# Patient Record
Sex: MALE | Race: WHITE | NOT HISPANIC OR LATINO
[De-identification: names, ages, dates, MRNs, and addresses within clinical notes are randomized per-mention and may not be internally consistent; named-entity substitution may affect disease eponyms.]

---

## 2019-03-21 ENCOUNTER — APPOINTMENT (OUTPATIENT)
Dept: DERMATOLOGY | Facility: CLINIC | Age: 59
End: 2019-03-21
Payer: COMMERCIAL

## 2019-03-21 PROCEDURE — 17003 DESTRUCT PREMALG LES 2-14: CPT

## 2019-03-21 PROCEDURE — 99214 OFFICE O/P EST MOD 30 MIN: CPT | Mod: 25

## 2019-03-21 PROCEDURE — 17000 DESTRUCT PREMALG LESION: CPT

## 2020-03-23 ENCOUNTER — APPOINTMENT (OUTPATIENT)
Dept: DERMATOLOGY | Facility: CLINIC | Age: 60
End: 2020-03-23

## 2020-07-07 ENCOUNTER — OUTPATIENT (OUTPATIENT)
Dept: OUTPATIENT SERVICES | Facility: HOSPITAL | Age: 60
LOS: 1 days | End: 2020-07-07

## 2020-07-10 ENCOUNTER — APPOINTMENT (OUTPATIENT)
Dept: DISASTER EMERGENCY | Facility: CLINIC | Age: 60
End: 2020-07-10

## 2020-07-10 DIAGNOSIS — Z01.818 ENCOUNTER FOR OTHER PREPROCEDURAL EXAMINATION: ICD-10-CM

## 2020-07-11 LAB — SARS-COV-2 N GENE NPH QL NAA+PROBE: NOT DETECTED

## 2020-07-13 ENCOUNTER — OUTPATIENT (OUTPATIENT)
Dept: OUTPATIENT SERVICES | Facility: HOSPITAL | Age: 60
LOS: 1 days | End: 2020-07-13

## 2021-05-07 ENCOUNTER — APPOINTMENT (OUTPATIENT)
Dept: SURGICAL ONCOLOGY | Facility: CLINIC | Age: 61
End: 2021-05-07
Payer: COMMERCIAL

## 2021-05-07 VITALS
TEMPERATURE: 97.9 F | SYSTOLIC BLOOD PRESSURE: 146 MMHG | BODY MASS INDEX: 30.11 KG/M2 | HEART RATE: 99 BPM | RESPIRATION RATE: 16 BRPM | HEIGHT: 77 IN | DIASTOLIC BLOOD PRESSURE: 89 MMHG | WEIGHT: 255 LBS | OXYGEN SATURATION: 98 %

## 2021-05-07 PROCEDURE — 99243 OFF/OP CNSLTJ NEW/EST LOW 30: CPT

## 2021-05-07 PROCEDURE — 99072 ADDL SUPL MATRL&STAF TM PHE: CPT

## 2021-05-11 ENCOUNTER — OUTPATIENT (OUTPATIENT)
Dept: OUTPATIENT SERVICES | Facility: HOSPITAL | Age: 61
LOS: 1 days | End: 2021-05-11
Payer: COMMERCIAL

## 2021-05-11 ENCOUNTER — APPOINTMENT (OUTPATIENT)
Dept: CT IMAGING | Facility: CLINIC | Age: 61
End: 2021-05-11
Payer: COMMERCIAL

## 2021-05-11 ENCOUNTER — APPOINTMENT (OUTPATIENT)
Dept: GASTROENTEROLOGY | Facility: CLINIC | Age: 61
End: 2021-05-11

## 2021-05-11 ENCOUNTER — OUTPATIENT (OUTPATIENT)
Dept: OUTPATIENT SERVICES | Facility: HOSPITAL | Age: 61
LOS: 1 days | End: 2021-05-11

## 2021-05-11 DIAGNOSIS — Z00.8 ENCOUNTER FOR OTHER GENERAL EXAMINATION: ICD-10-CM

## 2021-05-11 DIAGNOSIS — R17 UNSPECIFIED JAUNDICE: ICD-10-CM

## 2021-05-11 PROCEDURE — 74160 CT ABDOMEN W/CONTRAST: CPT

## 2021-05-11 PROCEDURE — 74160 CT ABDOMEN W/CONTRAST: CPT | Mod: 26

## 2021-05-17 RX ORDER — APIXABAN 5 MG/1
5 TABLET, FILM COATED ORAL
Qty: 180 | Refills: 1 | Status: ACTIVE | COMMUNITY
Start: 2021-05-10 | End: 1900-01-01

## 2021-05-21 ENCOUNTER — APPOINTMENT (OUTPATIENT)
Dept: GASTROENTEROLOGY | Facility: CLINIC | Age: 61
End: 2021-05-21
Payer: COMMERCIAL

## 2021-05-21 VITALS
RESPIRATION RATE: 16 BRPM | OXYGEN SATURATION: 98 % | SYSTOLIC BLOOD PRESSURE: 148 MMHG | WEIGHT: 256 LBS | HEART RATE: 98 BPM | TEMPERATURE: 98 F | HEIGHT: 77 IN | BODY MASS INDEX: 30.23 KG/M2 | DIASTOLIC BLOOD PRESSURE: 97 MMHG

## 2021-05-21 DIAGNOSIS — R17 UNSPECIFIED JAUNDICE: ICD-10-CM

## 2021-05-21 PROCEDURE — 99203 OFFICE O/P NEW LOW 30 MIN: CPT

## 2021-05-21 PROCEDURE — 99072 ADDL SUPL MATRL&STAF TM PHE: CPT

## 2021-05-21 NOTE — PHYSICAL EXAM
[FreeTextEntry1] : General: No acute distress. Well nourished and well kempt.\par Head: AT/NC\par Eyes: PERRL. EOMI.\par Pulmonary: No respiratory distress. \par ABD: Soft. ND. No rebound, no rigidity. No peritoneal signs. GB palpable on inspiration. cholecystostomy tube in place with minimal drainage, non-bilious.\par Extremities: Warm. No edema. \par Neurological: A&O x 4.\par Psychiatric: Normal affect and mood.\par

## 2021-05-21 NOTE — HISTORY OF PRESENT ILLNESS
[de-identified] : The patient is being consulted for EUS and ERCP.  The patient had symptoms of abdominal discomfort and was evaluated by gastroenterologist in [place].  He underwent EGD and colonoscopy and ultrasound abdominal in the late half of 2020 in beginning of 2021.  He was noted to have reflux and started on PPI therapy.  However his symptoms were worsening.  Eventually he was noted to have jaundice and was admitted in the beginning of April to [facility].  He was noted to have choledocholithiasis and underwent ERCP with stone removal and stent placement.  Eventually he was admitted with nausea and vomiting and was noted to have gastric outlet obstruction.  There was a large abscess noted.  There was a question of perforated gallbladder.  He underwent IR guided drain placement.  His bilirubin has become normal.  CA 19-9 level which was elevated on the index admission has become normal.  There was a question of portal vein thrombosis and on the last imaging recently it is no longer visible.  He is on Eliquis.  He is eating normally.  He is evaluated by Dr. Reg Stovall.  He is referred for EUS ERCP with cholangioscopy.

## 2021-05-21 NOTE — HISTORY OF PRESENT ILLNESS
[de-identified] : Mr. ALEX SAMS is a 61 year old male who presents today for an initial consultation. PMH of GERD, initially presented to [facility] 4/7/21 with abdominal pain and jaundice. Tbili 14.5, Ca19-9 401. Underwent CT 4/7/21 that noted intra and extrahepatic ductal dilation and findings suspicious for L PV thrombus, concerning for hepatobiliary or pancreatic malignancy although no discrete mass identified. Retroperitoneal and portacaval adenopathy suspicious for metastatic disease.  \par \par MRI 4/8/21 did not identify any suspicious hepatobiliary or pancreatic mass. L portal vein thrombus and subsegmental hepatic seg 6 PV thrombus with associated compensatory perfusion changes. Obstructing CBD calculus with proximal dilation of the biliary tract. Patient was started on a heparin gtt for coagulopathy. Underwent an EUS and ERCP 4/9/21 that noted a large 11mm stone impacting the bile duct with proximal biliary dilation.  Immediately distal to the stone was biliary wall thickening and narrowing of unclear etiology. no definitive pancreatic or biliary mass visualized.  Complete stone removal accomplished by biliary sphincterotomy, sphincteroplasty, mechanical lithotripsy and balloon extraction. Brushings and bx performed. 10fr plastic stent placed. bile duct bx showed scant superficial strips of intestinal type epithelium; no HGD identified. Was discharged with outpatient f/u. Bili 5.8 at discharge.\par \par Initially did well after discharge but then developed abd pain and chills as well as n/v and difficulty tolerating PO intake.  Represented to [place] on 4/26/21.  CT consistent with suspected perforated cholecystitis with large fluid collection likely resulting in GOO.  Had NGT placed for decompression. Underwent cholecystostomy placement in IR on 4/28/21 with purulent drainage. Discharged 4/30/21 with 10 day course of Augmentin.  On PO eliquis for PV thrombus. 4/30/21 labs: Ca19-9 is 12. Bili 1.2. \par \par Of note, there was concern for distal cholangio on initial admission given elevated Ca19-9, PVT and distal CBD narrowing. Brushings were negative and Ca19-9 is now normal. \par \par Complains of pain at the insertion site of guillermina tube. minimal drainage. fatigued. no other complaints.

## 2021-05-21 NOTE — ASSESSMENT
[FreeTextEntry1] : Mr. ALEX SAMS is a 61 year old male who presents today for an initial consultation. PMH of GERD, initially presented to [facility] 4/7/21 with abdominal pain and jaundice. Tbili 14.5, Ca19-9 401. I reviewed all of his imaging including CT 4/7/21 and MRI 4/8/21 that noted intra and extrahepatic ductal dilation, L PV thrombus, and concern for hepatobiliary or pancreatic malignancy although I do not see a definitive mass.\par He underwent an EUS and ERCP 4/9/21 that noted a large 11mm stone impacting the bile duct with proximal biliary dilation.  Immediately distal to the stone was biliary wall thickening and narrowing of unclear etiology. no definitive pancreatic or biliary mass visualized and bx negative.  Complete stone removal s/p biliary sphincterotomy, sphincteroplasty, mechanical lithotripsy and balloon extraction. 10fr plastic stent placed. Bili 5.8 at discharge.\par \par He went back into the hospital for perforated cholecystitis with large fluid collection likely resulting in GOO.  s/p cholecystostomy tube placement on 4/28/21 and discharged on Augmentin.  Most recent labs on 4/30/21; [id number]. Bili-1.2. On Eliquis for PV thrombus.\par \par Discussed that there is a concern for malignancy given his age with presentation of jaundice, although his story does not fit the usual presentation of painless jaundice.  The pain he described is more consistent with a benign process. I believe that this large stone may have been the impetus of the initial bout and he may have developed a subsequent episode of acute cholecystitis due to the internal BD stent which may have occluded the cystic duct.  Discussed that after an ERCP and sphincterotomy the biliary system is now colonized with bacteria and if there is a blockage patients can get sick. He now has the guillermina tube in for decompression, which is putting out minimal output.  Discussed that I believe he needs his gallbladder out, but this is not the best time to operate. There is a tremendous amount of inflammation at this time point and we would ideally want this to settle out prior to surgery.  I recommend a new CT to re-assess since his last admission. I also recommend an EUS/ERCP and spyglass in the next couple of weeks with repeat brushings/bx and stent removal. Although the initial bx was negative, we did discuss that there are false negatives and i think repeating a bx during the stent removal is worthwhile.  We will keep the cholecystostomy tube in place for now and once everything settles out we will plan to remove at the time of the cholecystectomy.  The patient wishes to transfer all his care to [facility] and we will get him plugged in with a GI to arrange the EUS/ERCP. We will follow up after the CT.\par \par Today, I personally spent 60 minutes in total time including reviewing imaging and studies, discussing complex treatment regimens, direct face to face time with the patient, patient education and counseling.\par  \par

## 2021-05-21 NOTE — PHYSICAL EXAM
[General Appearance - Alert] : alert [General Appearance - In No Acute Distress] : in no acute distress [Sclera] : the sclera and conjunctiva were normal [PERRL With Normal Accommodation] : pupils were equal in size, round, and reactive to light [Extraocular Movements] : extraocular movements were intact [Outer Ear] : the ears and nose were normal in appearance [Oropharynx] : the oropharynx was normal [Neck Appearance] : the appearance of the neck was normal [Neck Cervical Mass (___cm)] : no neck mass was observed [Jugular Venous Distention Increased] : there was no jugular-venous distention [Thyroid Diffuse Enlargement] : the thyroid was not enlarged [Thyroid Nodule] : there were no palpable thyroid nodules [Auscultation Breath Sounds / Voice Sounds] : lungs were clear to auscultation bilaterally [Heart Rate And Rhythm] : heart rate was normal and rhythm regular [Heart Sounds] : normal S1 and S2 [Heart Sounds Gallop] : no gallops [Murmurs] : no murmurs [Heart Sounds Pericardial Friction Rub] : no pericardial rub [Edema] : there was no peripheral edema [Bowel Sounds] : normal bowel sounds [Abdomen Soft] : soft [Abdomen Tenderness] : non-tender [Abdomen Mass (___ Cm)] : no abdominal mass palpated [FreeTextEntry1] : Right sided drain in place [Cervical Lymph Nodes Enlarged Posterior Bilaterally] : posterior cervical [Cervical Lymph Nodes Enlarged Anterior Bilaterally] : anterior cervical [Supraclavicular Lymph Nodes Enlarged Bilaterally] : supraclavicular [No CVA Tenderness] : no ~M costovertebral angle tenderness [No Spinal Tenderness] : no spinal tenderness [Abnormal Walk] : normal gait [Nail Clubbing] : no clubbing  or cyanosis of the fingernails [Motor Tone] : muscle strength and tone were normal [Musculoskeletal - Swelling] : no joint swelling seen [Skin Color & Pigmentation] : normal skin color and pigmentation [Skin Turgor] : normal skin turgor [] : no rash [No Focal Deficits] : no focal deficits [Oriented To Time, Place, And Person] : oriented to person, place, and time [Impaired Insight] : insight and judgment were intact [Affect] : the affect was normal

## 2021-05-21 NOTE — ASSESSMENT
[FreeTextEntry1] : I had an extensive discussion with the patient regarding the procedures.  We will schedule him for EGD/EUS and ERCP with stent pull and subsequent spyglass cholangioscopy.  Whether we have to replace the bile duct stent we will discuss with Dr. Reg Stovall.  The patient is medically optimized for these procedures.  I will obtain the blood work.\par \par Antony Diez MD\par Gastroenterology \par \par

## 2021-05-23 LAB
ALBUMIN SERPL ELPH-MCNC: 4.7 G/DL
ALP BLD-CCNC: 89 U/L
ALT SERPL-CCNC: 87 U/L
ANION GAP SERPL CALC-SCNC: 14 MMOL/L
AST SERPL-CCNC: 43 U/L
BASOPHILS # BLD AUTO: 0.04 K/UL
BASOPHILS NFR BLD AUTO: 0.6 %
BILIRUB SERPL-MCNC: 1.3 MG/DL
BUN SERPL-MCNC: 14 MG/DL
CALCIUM SERPL-MCNC: 10.3 MG/DL
CHLORIDE SERPL-SCNC: 105 MMOL/L
CO2 SERPL-SCNC: 24 MMOL/L
CREAT SERPL-MCNC: 1.08 MG/DL
EOSINOPHIL # BLD AUTO: 0.23 K/UL
EOSINOPHIL NFR BLD AUTO: 3.6 %
GLUCOSE SERPL-MCNC: 110 MG/DL
HCT VFR BLD CALC: 42.2 %
HGB BLD-MCNC: 13.7 G/DL
IMM GRANULOCYTES NFR BLD AUTO: 0.3 %
INR PPP: 1.3 RATIO
LYMPHOCYTES # BLD AUTO: 1.21 K/UL
LYMPHOCYTES NFR BLD AUTO: 18.7 %
MAN DIFF?: NORMAL
MCHC RBC-ENTMCNC: 29.8 PG
MCHC RBC-ENTMCNC: 32.5 GM/DL
MCV RBC AUTO: 91.7 FL
MONOCYTES # BLD AUTO: 0.56 K/UL
MONOCYTES NFR BLD AUTO: 8.7 %
NEUTROPHILS # BLD AUTO: 4.4 K/UL
NEUTROPHILS NFR BLD AUTO: 68.1 %
PLATELET # BLD AUTO: 288 K/UL
POTASSIUM SERPL-SCNC: 4.1 MMOL/L
PROT SERPL-MCNC: 7.6 G/DL
PT BLD: 15.2 SEC
RBC # BLD: 4.6 M/UL
RBC # FLD: 15 %
SODIUM SERPL-SCNC: 143 MMOL/L
WBC # FLD AUTO: 6.46 K/UL

## 2021-06-07 ENCOUNTER — APPOINTMENT (OUTPATIENT)
Dept: DISASTER EMERGENCY | Facility: CLINIC | Age: 61
End: 2021-06-07

## 2021-06-07 LAB — SARS-COV-2 N GENE NPH QL NAA+PROBE: NOT DETECTED

## 2021-06-09 ENCOUNTER — TRANSCRIPTION ENCOUNTER (OUTPATIENT)
Age: 61
End: 2021-06-09

## 2021-06-10 ENCOUNTER — RESULT REVIEW (OUTPATIENT)
Age: 61
End: 2021-06-10

## 2021-06-10 ENCOUNTER — APPOINTMENT (OUTPATIENT)
Dept: GASTROENTEROLOGY | Facility: HOSPITAL | Age: 61
End: 2021-06-10

## 2021-06-10 ENCOUNTER — OUTPATIENT (OUTPATIENT)
Dept: OUTPATIENT SERVICES | Facility: HOSPITAL | Age: 61
LOS: 1 days | End: 2021-06-10
Payer: COMMERCIAL

## 2021-06-10 DIAGNOSIS — K81.9 CHOLECYSTITIS, UNSPECIFIED: ICD-10-CM

## 2021-06-10 DIAGNOSIS — I81 PORTAL VEIN THROMBOSIS: ICD-10-CM

## 2021-06-10 PROCEDURE — 43264 ERCP REMOVE DUCT CALCULI: CPT

## 2021-06-10 PROCEDURE — 43276 ERCP STENT EXCHANGE W/DILATE: CPT | Mod: 59

## 2021-06-10 PROCEDURE — 74330 X-RAY BILE/PANC ENDOSCOPY: CPT

## 2021-06-10 PROCEDURE — 43239 EGD BIOPSY SINGLE/MULTIPLE: CPT

## 2021-06-10 PROCEDURE — 43259 EGD US EXAM DUODENUM/JEJUNUM: CPT

## 2021-06-10 PROCEDURE — 88342 IMHCHEM/IMCYTCHM 1ST ANTB: CPT

## 2021-06-10 PROCEDURE — C1769: CPT

## 2021-06-10 PROCEDURE — 88342 IMHCHEM/IMCYTCHM 1ST ANTB: CPT | Mod: 26

## 2021-06-10 PROCEDURE — 43273 ENDOSCOPIC PANCREATOSCOPY: CPT | Mod: 59

## 2021-06-10 PROCEDURE — 88300 SURGICAL PATH GROSS: CPT | Mod: 26,59

## 2021-06-10 PROCEDURE — 43274 ERCP DUCT STENT PLACEMENT: CPT

## 2021-06-10 PROCEDURE — 88305 TISSUE EXAM BY PATHOLOGIST: CPT | Mod: 26

## 2021-06-10 PROCEDURE — C1773: CPT

## 2021-06-10 PROCEDURE — 88300 SURGICAL PATH GROSS: CPT

## 2021-06-10 PROCEDURE — 43277 ERCP EA DUCT/AMPULLA DILATE: CPT | Mod: 59

## 2021-06-10 PROCEDURE — C1726: CPT

## 2021-06-10 PROCEDURE — 88305 TISSUE EXAM BY PATHOLOGIST: CPT

## 2021-06-12 PROBLEM — I81 PORTAL VEIN THROMBOSIS: Status: ACTIVE | Noted: 2021-05-10

## 2021-06-12 RX ORDER — AMOXICILLIN AND CLAVULANATE POTASSIUM 875; 125 MG/1; MG/1
875-125 TABLET, COATED ORAL
Qty: 20 | Refills: 0 | Status: ACTIVE | COMMUNITY
Start: 2021-06-12 | End: 1900-01-01

## 2021-06-14 LAB — SURGICAL PATHOLOGY STUDY: SIGNIFICANT CHANGE UP

## 2021-06-15 RX ORDER — AMOXICILLIN AND CLAVULANATE POTASSIUM 875; 125 MG/1; MG/1
875-125 TABLET, COATED ORAL
Qty: 20 | Refills: 0 | Status: ACTIVE | COMMUNITY
Start: 2021-06-12 | End: 1900-01-01

## 2021-06-16 ENCOUNTER — APPOINTMENT (OUTPATIENT)
Dept: CT IMAGING | Facility: CLINIC | Age: 61
End: 2021-06-16
Payer: COMMERCIAL

## 2021-06-16 ENCOUNTER — OUTPATIENT (OUTPATIENT)
Dept: OUTPATIENT SERVICES | Facility: HOSPITAL | Age: 61
LOS: 1 days | End: 2021-06-16
Payer: COMMERCIAL

## 2021-06-16 DIAGNOSIS — K80.51 CALCULUS OF BILE DUCT WITHOUT CHOLANGITIS OR CHOLECYSTITIS WITH OBSTRUCTION: ICD-10-CM

## 2021-06-16 PROCEDURE — 74160 CT ABDOMEN W/CONTRAST: CPT | Mod: 26

## 2021-06-16 PROCEDURE — 74160 CT ABDOMEN W/CONTRAST: CPT

## 2021-06-21 ENCOUNTER — APPOINTMENT (OUTPATIENT)
Dept: SURGICAL ONCOLOGY | Facility: CLINIC | Age: 61
End: 2021-06-21
Payer: COMMERCIAL

## 2021-06-21 ENCOUNTER — OUTPATIENT (OUTPATIENT)
Dept: OUTPATIENT SERVICES | Facility: HOSPITAL | Age: 61
LOS: 1 days | End: 2021-06-21
Payer: COMMERCIAL

## 2021-06-21 VITALS
RESPIRATION RATE: 17 BRPM | HEIGHT: 77 IN | SYSTOLIC BLOOD PRESSURE: 153 MMHG | BODY MASS INDEX: 30.23 KG/M2 | DIASTOLIC BLOOD PRESSURE: 88 MMHG | OXYGEN SATURATION: 98 % | HEART RATE: 66 BPM | WEIGHT: 256 LBS

## 2021-06-21 VITALS
WEIGHT: 166.01 LBS | SYSTOLIC BLOOD PRESSURE: 148 MMHG | HEIGHT: 77 IN | OXYGEN SATURATION: 99 % | HEART RATE: 72 BPM | DIASTOLIC BLOOD PRESSURE: 80 MMHG | RESPIRATION RATE: 16 BRPM | TEMPERATURE: 98 F

## 2021-06-21 DIAGNOSIS — K80.51 CALCULUS OF BILE DUCT WITHOUT CHOLANGITIS OR CHOLECYSTITIS WITH OBSTRUCTION: ICD-10-CM

## 2021-06-21 DIAGNOSIS — K80.50 CALCULUS OF BILE DUCT WITHOUT CHOLANGITIS OR CHOLECYSTITIS WITHOUT OBSTRUCTION: ICD-10-CM

## 2021-06-21 DIAGNOSIS — Z98.890 OTHER SPECIFIED POSTPROCEDURAL STATES: Chronic | ICD-10-CM

## 2021-06-21 DIAGNOSIS — Z43.4 ENCOUNTER FOR ATTENTION TO OTHER ARTIFICIAL OPENINGS OF DIGESTIVE TRACT: Chronic | ICD-10-CM

## 2021-06-21 DIAGNOSIS — R09.89 OTHER SPECIFIED SYMPTOMS AND SIGNS INVOLVING THE CIRCULATORY AND RESPIRATORY SYSTEMS: ICD-10-CM

## 2021-06-21 LAB
ALBUMIN SERPL ELPH-MCNC: 4.5 G/DL — SIGNIFICANT CHANGE UP (ref 3.3–5)
ALP SERPL-CCNC: 78 U/L — SIGNIFICANT CHANGE UP (ref 40–120)
ALT FLD-CCNC: 57 U/L — HIGH (ref 4–41)
ANION GAP SERPL CALC-SCNC: 14 MMOL/L — SIGNIFICANT CHANGE UP (ref 7–14)
AST SERPL-CCNC: 30 U/L — SIGNIFICANT CHANGE UP (ref 4–40)
BILIRUB SERPL-MCNC: 0.5 MG/DL — SIGNIFICANT CHANGE UP (ref 0.2–1.2)
BUN SERPL-MCNC: 13 MG/DL — SIGNIFICANT CHANGE UP (ref 7–23)
CALCIUM SERPL-MCNC: 9.8 MG/DL — SIGNIFICANT CHANGE UP (ref 8.4–10.5)
CHLORIDE SERPL-SCNC: 104 MMOL/L — SIGNIFICANT CHANGE UP (ref 98–107)
CO2 SERPL-SCNC: 22 MMOL/L — SIGNIFICANT CHANGE UP (ref 22–31)
CREAT SERPL-MCNC: 0.86 MG/DL — SIGNIFICANT CHANGE UP (ref 0.5–1.3)
GLUCOSE SERPL-MCNC: 103 MG/DL — HIGH (ref 70–99)
HCT VFR BLD CALC: 43.7 % — SIGNIFICANT CHANGE UP (ref 39–50)
HGB BLD-MCNC: 14.1 G/DL — SIGNIFICANT CHANGE UP (ref 13–17)
MCHC RBC-ENTMCNC: 29.4 PG — SIGNIFICANT CHANGE UP (ref 27–34)
MCHC RBC-ENTMCNC: 32.3 GM/DL — SIGNIFICANT CHANGE UP (ref 32–36)
MCV RBC AUTO: 91.2 FL — SIGNIFICANT CHANGE UP (ref 80–100)
NRBC # BLD: 0 /100 WBCS — SIGNIFICANT CHANGE UP
NRBC # FLD: 0 K/UL — SIGNIFICANT CHANGE UP
PLATELET # BLD AUTO: 339 K/UL — SIGNIFICANT CHANGE UP (ref 150–400)
POTASSIUM SERPL-MCNC: 4 MMOL/L — SIGNIFICANT CHANGE UP (ref 3.5–5.3)
POTASSIUM SERPL-SCNC: 4 MMOL/L — SIGNIFICANT CHANGE UP (ref 3.5–5.3)
PROT SERPL-MCNC: 7.6 G/DL — SIGNIFICANT CHANGE UP (ref 6–8.3)
RBC # BLD: 4.79 M/UL — SIGNIFICANT CHANGE UP (ref 4.2–5.8)
RBC # FLD: 14.2 % — SIGNIFICANT CHANGE UP (ref 10.3–14.5)
SODIUM SERPL-SCNC: 140 MMOL/L — SIGNIFICANT CHANGE UP (ref 135–145)
WBC # BLD: 7.15 K/UL — SIGNIFICANT CHANGE UP (ref 3.8–10.5)
WBC # FLD AUTO: 7.15 K/UL — SIGNIFICANT CHANGE UP (ref 3.8–10.5)

## 2021-06-21 PROCEDURE — 93010 ELECTROCARDIOGRAM REPORT: CPT

## 2021-06-21 PROCEDURE — 99212 OFFICE O/P EST SF 10 MIN: CPT

## 2021-06-21 PROCEDURE — 99072 ADDL SUPL MATRL&STAF TM PHE: CPT

## 2021-06-21 RX ORDER — SODIUM CHLORIDE 9 MG/ML
3 INJECTION INTRAMUSCULAR; INTRAVENOUS; SUBCUTANEOUS EVERY 8 HOURS
Refills: 0 | Status: DISCONTINUED | OUTPATIENT
Start: 2021-07-01 | End: 2021-07-05

## 2021-06-21 NOTE — H&P PST ADULT - NSANTHOSAYNRD_GEN_A_CORE
No. PASHA screening performed.  STOP BANG Legend: 0-2 = LOW Risk; 3-4 = INTERMEDIATE Risk; 5-8 = HIGH Risk

## 2021-06-21 NOTE — H&P PST ADULT - NSICDXPASTMEDICALHX_GEN_ALL_CORE_FT
PAST MEDICAL HISTORY:  Calculus of bile duct     Inguinal hernia     Portal vein thrombosis on Eliquis

## 2021-06-21 NOTE — H&P PST ADULT - GASTROINTESTINAL COMMENTS
biliary drain noted with minimal drainage, suture appears to have come off biliary drain noted with minimal yellow drainage, suture appears to have come off

## 2021-06-21 NOTE — H&P PST ADULT - NSICDXFAMILYHX_GEN_ALL_CORE_FT
FAMILY HISTORY:  Father  Still living? Unknown  FH: lung cancer, Age at diagnosis: Age Unknown    Mother  Still living? Unknown  FH: CHF (congestive heart failure), Age at diagnosis: Age Unknown  FH: heart attack, Age at diagnosis: Age Unknown

## 2021-06-21 NOTE — H&P PST ADULT - HISTORY OF PRESENT ILLNESS
61 year old male admitted to [facility] in 4/2021 for abdominal pain and jaundice, found to have Left portal vein thrombus, and w obstructing stone of CBD, s/p ERCP with biliary sphincterotomy, stone removal and stent placement. Pt treated with anticoagulation for thrombus. Pt  later readmitted for perforated cholecystitis s/p cholecystostomy tube placement and treatment with antibx. Bile duct stent was later rplaced. Pt presents today for presurgical evaluation for ... 61 year old male admitted to [facility] in 4/2021 for abdominal pain and jaundice, found to have Left portal vein thrombus, and with obstructing stone of CBD, s/p ERCP with biliary sphincterotomy, stone removal and stent placement. Pt treated with anticoagulation for thrombus. Pt  later readmitted for perforated cholecystitis s/p cholecystostomy tube placement and treatment with antibx. Bile duct stent was later replaced. Pt presents today for presurgical evaluation for Open Cholecystectomy.

## 2021-06-21 NOTE — H&P PST ADULT - NSICDXPASTSURGICALHX_GEN_ALL_CORE_FT
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

## 2021-06-21 NOTE — H&P PST ADULT - NSICDXPROBLEM_GEN_ALL_CORE_FT
PROBLEM DIAGNOSES  Problem: Calculus of bile duct without cholangitis or cholecystitis  Assessment and Plan: Pt scheduled for surgery on 7/1/2021.  Pre-op instructions provided. Pt verbalized understanding.   Pepcid provided for GI prophylaxis.   Pt given detailed verbal and written instructions on chlorhexidine wash. Pt verbalized understanding with teachback.   Suture on biliary drain appears to have come off - Called Dr. Stovall's office and spoke with NP Beata who states pt can come to their office and she will check the drain.   Spoke to surgical coordinator Michelle who states per surgeon pt should hold Eliquis 48 hours prior to surgery.

## 2021-06-23 NOTE — HISTORY OF PRESENT ILLNESS
[de-identified] : Mr. ALEX SAMS is a 61 year old male who presents today for a follow-up visit.  He presents for assessment of perc. cholecystostomy tube. He went for PSTs today and noted by RN to have possible suture out. \par \par PMH of GERD, initially presented to [facility] 4/7/21 with abdominal pain and jaundice. Tbili 14.5, Ca19-9 401. \par Underwent CT 4/7/21 that noted intra and extrahepatic ductal dilation and findings suspicious for L PV thrombus, concerning for hepatobiliary or pancreatic malignancy although no discrete mass identified. Retroperitoneal and portacaval adenopathy suspicious for metastatic disease.  \par \par MRI 4/8/21 did not identify any suspicious hepatobiliary or pancreatic mass. L portal vein thrombus and subsegmental hepatic seg 6 PV thrombus with associated compensatory perfusion changes. Obstructing CBD calculus with proximal dilation of the biliary tract. Patient was started on a heparin gtt for coagulopathy. Underwent an EUS and ERCP 4/9/21 that noted a large 11mm stone impacting the bile duct with proximal biliary dilation.  Immediately distal to the stone was biliary wall thickening and narrowing of unclear etiology. no definitive pancreatic or biliary mass visualized.  Complete stone removal accomplished by biliary sphincterotomy, sphincteroplasty, mechanical lithotripsy and balloon extraction. Brushings and bx performed. 10fr plastic stent placed. bile duct bx showed scant superficial strips of intestinal type epithelium; no HGD identified. Was discharged with outpatient f/u. Bili 5.8 at discharge.\par \par Initially did well after discharge but then developed abd pain and chills as well as n/v and difficulty tolerating PO intake.  Represented to [place] on 4/26/21.  CT consistent with suspected perforated cholecystitis with large fluid collection likely resulting in GOO.  Had NGT placed for decompression. Underwent cholecystostomy placement in IR on 4/28/21 with purulent drainage. Discharged 4/30/21 with 10 day course of Augmentin.  On PO eliquis for PV thrombus. 4/30/21 labs: Ca19-9 is 12. Bili 1.2. \par \par Of note, there was concern for distal cholangio on initial admission given elevated Ca19-9, PVT and distal CBD narrowing. Brushings were negative and Ca19-9 is now normal. \par \par \par 6/21/21:  Patient reports feeling well.  He continues to have some discomfort local to the perc. guillermina tube site that is not new and not worsening.  Continues to have minimal drainage. Denies any fevers. Tolerating PO.

## 2021-06-23 NOTE — PHYSICAL EXAM
[Normal] : oriented to person, place and time, with appropriate affect [de-identified] : Anicteric  [de-identified] : Supple  [de-identified] : Normal respiratory effort  [de-identified] : Not distended, no acute tenderness, rebound, guarding or rigidity.   Perc guillermina tube with small bilious drainag in bag, intact with sutures on tubing adjacent to skin externally.    [de-identified] : normal appearance

## 2021-06-23 NOTE — ASSESSMENT
[FreeTextEntry1] : 61 year old M with history of perforated cholecystitis with large fluid collection likely resulting in GOO. s/p cholecystostomy tube placement on 4/28/21 for decompression, and was discharged on Augmentin. \par \par Plan:\par Continue cholecystostomy tube which is planned to be removed at the time of the cholecystectomy. He is on schedule for OR 7/1/21.  Patient aware to be mindful of tubing in the interim, which is now taped to abdomen to help prevent dislodgement.

## 2021-06-30 ENCOUNTER — TRANSCRIPTION ENCOUNTER (OUTPATIENT)
Age: 61
End: 2021-06-30

## 2021-06-30 NOTE — ASU PATIENT PROFILE, ADULT - PSH
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

## 2021-07-01 ENCOUNTER — INPATIENT (INPATIENT)
Facility: HOSPITAL | Age: 61
LOS: 3 days | Discharge: HOME | End: 2021-07-05
Attending: SURGERY | Admitting: SURGERY
Payer: COMMERCIAL

## 2021-07-01 ENCOUNTER — APPOINTMENT (OUTPATIENT)
Dept: SURGICAL ONCOLOGY | Facility: HOSPITAL | Age: 61
End: 2021-07-01

## 2021-07-01 VITALS
WEIGHT: 166.01 LBS | OXYGEN SATURATION: 95 % | TEMPERATURE: 98 F | SYSTOLIC BLOOD PRESSURE: 136 MMHG | HEART RATE: 77 BPM | DIASTOLIC BLOOD PRESSURE: 81 MMHG | HEIGHT: 77 IN | RESPIRATION RATE: 16 BRPM

## 2021-07-01 DIAGNOSIS — K80.51 CALCULUS OF BILE DUCT WITHOUT CHOLANGITIS OR CHOLECYSTITIS WITH OBSTRUCTION: ICD-10-CM

## 2021-07-01 DIAGNOSIS — Z98.890 OTHER SPECIFIED POSTPROCEDURAL STATES: Chronic | ICD-10-CM

## 2021-07-01 DIAGNOSIS — Z43.4 ENCOUNTER FOR ATTENTION TO OTHER ARTIFICIAL OPENINGS OF DIGESTIVE TRACT: Chronic | ICD-10-CM

## 2021-07-01 PROCEDURE — 47600 CHOLECYSTECTOMY: CPT

## 2021-07-01 RX ORDER — SODIUM CHLORIDE 9 MG/ML
1000 INJECTION, SOLUTION INTRAVENOUS
Refills: 0 | Status: DISCONTINUED | OUTPATIENT
Start: 2021-07-01 | End: 2021-07-01

## 2021-07-01 RX ORDER — HYDROMORPHONE HYDROCHLORIDE 2 MG/ML
30 INJECTION INTRAMUSCULAR; INTRAVENOUS; SUBCUTANEOUS
Refills: 0 | Status: DISCONTINUED | OUTPATIENT
Start: 2021-07-01 | End: 2021-07-02

## 2021-07-01 RX ORDER — KETOROLAC TROMETHAMINE 30 MG/ML
15 SYRINGE (ML) INJECTION EVERY 8 HOURS
Refills: 0 | Status: DISCONTINUED | OUTPATIENT
Start: 2021-07-01 | End: 2021-07-03

## 2021-07-01 RX ORDER — ENOXAPARIN SODIUM 100 MG/ML
40 INJECTION SUBCUTANEOUS DAILY
Refills: 0 | Status: DISCONTINUED | OUTPATIENT
Start: 2021-07-02 | End: 2021-07-05

## 2021-07-01 RX ORDER — CEFOTETAN DISODIUM 1 G
2 VIAL (EA) INJECTION EVERY 12 HOURS
Refills: 0 | Status: COMPLETED | OUTPATIENT
Start: 2021-07-01 | End: 2021-07-02

## 2021-07-01 RX ORDER — NALOXONE HYDROCHLORIDE 4 MG/.1ML
0.1 SPRAY NASAL
Refills: 0 | Status: DISCONTINUED | OUTPATIENT
Start: 2021-07-01 | End: 2021-07-05

## 2021-07-01 RX ORDER — APIXABAN 2.5 MG/1
1 TABLET, FILM COATED ORAL
Qty: 0 | Refills: 0 | DISCHARGE

## 2021-07-01 RX ORDER — FENTANYL CITRATE 50 UG/ML
25 INJECTION INTRAVENOUS
Refills: 0 | Status: DISCONTINUED | OUTPATIENT
Start: 2021-07-01 | End: 2021-07-01

## 2021-07-01 RX ORDER — ONDANSETRON 8 MG/1
4 TABLET, FILM COATED ORAL ONCE
Refills: 0 | Status: DISCONTINUED | OUTPATIENT
Start: 2021-07-01 | End: 2021-07-01

## 2021-07-01 RX ORDER — HYDROMORPHONE HYDROCHLORIDE 2 MG/ML
0.5 INJECTION INTRAMUSCULAR; INTRAVENOUS; SUBCUTANEOUS
Refills: 0 | Status: DISCONTINUED | OUTPATIENT
Start: 2021-07-01 | End: 2021-07-01

## 2021-07-01 RX ORDER — ACETAMINOPHEN 500 MG
1000 TABLET ORAL EVERY 6 HOURS
Refills: 0 | Status: COMPLETED | OUTPATIENT
Start: 2021-07-01 | End: 2021-07-02

## 2021-07-01 RX ORDER — SODIUM CHLORIDE 9 MG/ML
1000 INJECTION INTRAMUSCULAR; INTRAVENOUS; SUBCUTANEOUS
Refills: 0 | Status: DISCONTINUED | OUTPATIENT
Start: 2021-07-01 | End: 2021-07-02

## 2021-07-01 RX ADMIN — Medication 400 MILLIGRAM(S): at 23:00

## 2021-07-01 RX ADMIN — HYDROMORPHONE HYDROCHLORIDE 0.5 MILLIGRAM(S): 2 INJECTION INTRAMUSCULAR; INTRAVENOUS; SUBCUTANEOUS at 11:17

## 2021-07-01 RX ADMIN — Medication 400 MILLIGRAM(S): at 18:38

## 2021-07-01 RX ADMIN — SODIUM CHLORIDE 20 MILLILITER(S): 9 INJECTION INTRAMUSCULAR; INTRAVENOUS; SUBCUTANEOUS at 21:00

## 2021-07-01 RX ADMIN — HYDROMORPHONE HYDROCHLORIDE 30 MILLILITER(S): 2 INJECTION INTRAMUSCULAR; INTRAVENOUS; SUBCUTANEOUS at 19:27

## 2021-07-01 RX ADMIN — SODIUM CHLORIDE 30 MILLILITER(S): 9 INJECTION, SOLUTION INTRAVENOUS at 06:25

## 2021-07-01 RX ADMIN — Medication 100 GRAM(S): at 21:00

## 2021-07-01 RX ADMIN — HYDROMORPHONE HYDROCHLORIDE 0.5 MILLIGRAM(S): 2 INJECTION INTRAMUSCULAR; INTRAVENOUS; SUBCUTANEOUS at 10:01

## 2021-07-01 RX ADMIN — SODIUM CHLORIDE 3 MILLILITER(S): 9 INJECTION INTRAMUSCULAR; INTRAVENOUS; SUBCUTANEOUS at 22:50

## 2021-07-01 RX ADMIN — HYDROMORPHONE HYDROCHLORIDE 0.5 MILLIGRAM(S): 2 INJECTION INTRAMUSCULAR; INTRAVENOUS; SUBCUTANEOUS at 10:15

## 2021-07-01 RX ADMIN — HYDROMORPHONE HYDROCHLORIDE 30 MILLILITER(S): 2 INJECTION INTRAMUSCULAR; INTRAVENOUS; SUBCUTANEOUS at 13:48

## 2021-07-01 RX ADMIN — HYDROMORPHONE HYDROCHLORIDE 0.5 MILLIGRAM(S): 2 INJECTION INTRAMUSCULAR; INTRAVENOUS; SUBCUTANEOUS at 13:44

## 2021-07-01 RX ADMIN — Medication 1000 MILLIGRAM(S): at 19:42

## 2021-07-01 RX ADMIN — SODIUM CHLORIDE 3 MILLILITER(S): 9 INJECTION INTRAMUSCULAR; INTRAVENOUS; SUBCUTANEOUS at 14:02

## 2021-07-01 NOTE — BRIEF OPERATIVE NOTE - OPERATION/FINDINGS
Right subcostal incision for planned open cholecystectomy. Very densely adherent omentum to liver. Dome of gallbladder identified and opened with return of pus, no bile. Culture sent. Decision made to abort cholecystectomy as further dissection would pose great risk of injury to surrounding structures. Cholecystostomy tube placed in gallbladder. Pre-existing perc guillermina tube removed.

## 2021-07-01 NOTE — PROGRESS NOTE ADULT - ASSESSMENT
Pt is 62 YO man hx of left portal vein thrombosis and stone obstructing CBD s/p ERCP and biliary stent placement, perforated cholecystitis s/p cholecystostomy placement. Pt was scheduled for open cholecystectomy however due to large amounts of adhesion gallbladder was unable to be removed. Drain was placed in gallbladder.     Plan:  - Pain control with Hydromorphone PCA, 25 ug Fentanyl IV push PRN for mod pain, 0.5mg Hydromorphone IV push PRN for severe pain, Acetamenophen 1g IV q6h  - DVT ppx Lovenox subq  - Cefotetan 2mg IV q12H x2 doses for pus found in gallbladder  - Started on Clear liquid diet, advance as tolerated  - OOB and ambulating as tolerated  - F/u AM labs

## 2021-07-01 NOTE — ASU PREOP CHECKLIST - AICD PRESENT
Daily Note     Today's date: 2020  Patient name: Jeremiah Sparrow  : 1975  MRN: [id number]  Referring provider: Almaz Dos Santos MD  Dx:   Encounter Diagnosis     ICD-10-CM    1  Peroneal tendon tear, left, initial encounter S86 312A    2  S/P peroneal tendon repair Z98 890                   Subjective: Pt reports significant increase in LLE pain over past few days that she attributes to walking more and putting more weight onto leg over past week since last session  Scar is TTP and pt said she removed a stitch yesterday from proximal end  Objective: See treatment diary below     Precautions: WBAT in CAM boot, migraines    Manual            PROM L ankle  SH SH          Talar PA GR II  NC            Calcaneal med/lat mobs Gr II  NC                                        Exercise Diary             BAPS- AP, ML L2  20x ea L2  30x ea           Seated HR/TR 20x ea 30x ea           gastroc stretch Strap 30"x3 Strap 30"x3           S/L  Inv/ eversion Seated 30x ea Seated 30x ea           weightshift (biodex if needed) Low box R 10"x10 ea 10"x10 AP & ML                        Toe curls Flex/ext 30x ea Flex/ext 30x ea                                                                                           Modalities            Gameready in elevation perf   15' 15' post, coldest, low                                         Assessment: Tolerated treatment fair  Patient demonstrated fatigue post treatment, exhibited good technique with therapeutic exercises and would benefit from continued PT to address limited mobility and high pain levels  Plan: Continue per plan of care  Progress treatment as tolerated       Tete Brown PTA
no

## 2021-07-01 NOTE — PROGRESS NOTE ADULT - SUBJECTIVE AND OBJECTIVE BOX
Pt was seen postoperatively in the PACU s/p open cholecystostomy tube placement. Pt endorsed some abdominal tenderness but that pain was manageable. Negative flatus. Dressing securely in place. Drain with approximately 50mL serosanguineous discharge. Singh catheter in place with approximately 325mL yellow urine.            Vital Signs Last 24 Hrs  T(C): 36.8 (01 Jul 2021 09:50), Max: 36.8 (01 Jul 2021 09:50)  T(F): 98.2 (01 Jul 2021 09:50), Max: 98.2 (01 Jul 2021 09:50)  HR: 87 (01 Jul 2021 15:00) (74 - 87)  BP: 147/93 (01 Jul 2021 15:00) (136/81 - 180/107)  BP(mean): 104 (01 Jul 2021 15:00) (85 - 121)  RR: 15 (01 Jul 2021 15:00) (9 - 16)  SpO2: 98% (01 Jul 2021 15:00) (94% - 99%)  I&O's Detail    01 Jul 2021 07:01  -  01 Jul 2021 15:52  --------------------------------------------------------  IN:    Lactated Ringers: 375 mL  Total IN: 375 mL    OUT:    Indwelling Catheter - Urethral (mL): 325 mL  Total OUT: 325 mL    Total NET: 50 mL        cefoTEtan  IVPB 2  cefoTEtan  IVPB 2    PAST MEDICAL & SURGICAL HISTORY:  Inguinal hernia    Calculus of bile duct    Portal vein thrombosis  on Eliquis    H/O inguinal hernia repair    H/O nasal septoplasty    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 Drain in place with 50mL serosanguineous discharge  Extremities: WWP

## 2021-07-01 NOTE — PATIENT PROFILE ADULT - NSPROPOAURINARYCATHETER_GEN_A_NUR
Left message for patient at  [phone] (home) to schedule Consult.  Patient to return call to Belen MCCORMICK [phone]   no

## 2021-07-02 LAB
ALBUMIN SERPL ELPH-MCNC: 4.4 G/DL — SIGNIFICANT CHANGE UP (ref 3.3–5)
ALP SERPL-CCNC: 78 U/L — SIGNIFICANT CHANGE UP (ref 40–120)
ALT FLD-CCNC: 65 U/L — HIGH (ref 4–41)
ANION GAP SERPL CALC-SCNC: 16 MMOL/L — HIGH (ref 7–14)
AST SERPL-CCNC: 44 U/L — HIGH (ref 4–40)
BILIRUB SERPL-MCNC: 1.4 MG/DL — HIGH (ref 0.2–1.2)
BUN SERPL-MCNC: 13 MG/DL — SIGNIFICANT CHANGE UP (ref 7–23)
CALCIUM SERPL-MCNC: 9.8 MG/DL — SIGNIFICANT CHANGE UP (ref 8.4–10.5)
CHLORIDE SERPL-SCNC: 100 MMOL/L — SIGNIFICANT CHANGE UP (ref 98–107)
CO2 SERPL-SCNC: 21 MMOL/L — LOW (ref 22–31)
COVID-19 SPIKE DOMAIN AB INTERP: POSITIVE
COVID-19 SPIKE DOMAIN ANTIBODY RESULT: 105 U/ML — HIGH
CREAT SERPL-MCNC: 0.85 MG/DL — SIGNIFICANT CHANGE UP (ref 0.5–1.3)
GLUCOSE SERPL-MCNC: 120 MG/DL — HIGH (ref 70–99)
HCT VFR BLD CALC: 43.5 % — SIGNIFICANT CHANGE UP (ref 39–50)
HGB BLD-MCNC: 14.7 G/DL — SIGNIFICANT CHANGE UP (ref 13–17)
MAGNESIUM SERPL-MCNC: 2.1 MG/DL — SIGNIFICANT CHANGE UP (ref 1.6–2.6)
MCHC RBC-ENTMCNC: 30.5 PG — SIGNIFICANT CHANGE UP (ref 27–34)
MCHC RBC-ENTMCNC: 33.8 GM/DL — SIGNIFICANT CHANGE UP (ref 32–36)
MCV RBC AUTO: 90.2 FL — SIGNIFICANT CHANGE UP (ref 80–100)
NRBC # BLD: 0 /100 WBCS — SIGNIFICANT CHANGE UP
NRBC # FLD: 0 K/UL — SIGNIFICANT CHANGE UP
PHOSPHATE SERPL-MCNC: 3.2 MG/DL — SIGNIFICANT CHANGE UP (ref 2.5–4.5)
PLATELET # BLD AUTO: 215 K/UL — SIGNIFICANT CHANGE UP (ref 150–400)
POTASSIUM SERPL-MCNC: 4.3 MMOL/L — SIGNIFICANT CHANGE UP (ref 3.5–5.3)
POTASSIUM SERPL-SCNC: 4.3 MMOL/L — SIGNIFICANT CHANGE UP (ref 3.5–5.3)
PROT SERPL-MCNC: 7.4 G/DL — SIGNIFICANT CHANGE UP (ref 6–8.3)
RBC # BLD: 4.82 M/UL — SIGNIFICANT CHANGE UP (ref 4.2–5.8)
RBC # FLD: 13.6 % — SIGNIFICANT CHANGE UP (ref 10.3–14.5)
SARS-COV-2 IGG+IGM SERPL QL IA: 105 U/ML — HIGH
SARS-COV-2 IGG+IGM SERPL QL IA: POSITIVE
SODIUM SERPL-SCNC: 137 MMOL/L — SIGNIFICANT CHANGE UP (ref 135–145)
WBC # BLD: 17.69 K/UL — HIGH (ref 3.8–10.5)
WBC # FLD AUTO: 17.69 K/UL — HIGH (ref 3.8–10.5)

## 2021-07-02 RX ORDER — OXYCODONE HYDROCHLORIDE 5 MG/1
10 TABLET ORAL
Refills: 0 | Status: DISCONTINUED | OUTPATIENT
Start: 2021-07-02 | End: 2021-07-05

## 2021-07-02 RX ORDER — OXYCODONE HYDROCHLORIDE 5 MG/1
5 TABLET ORAL
Refills: 0 | Status: DISCONTINUED | OUTPATIENT
Start: 2021-07-02 | End: 2021-07-05

## 2021-07-02 RX ORDER — ACETAMINOPHEN 500 MG
650 TABLET ORAL EVERY 6 HOURS
Refills: 0 | Status: DISCONTINUED | OUTPATIENT
Start: 2021-07-02 | End: 2021-07-03

## 2021-07-02 RX ADMIN — Medication 100 GRAM(S): at 09:03

## 2021-07-02 RX ADMIN — Medication 650 MILLIGRAM(S): at 23:07

## 2021-07-02 RX ADMIN — SODIUM CHLORIDE 3 MILLILITER(S): 9 INJECTION INTRAMUSCULAR; INTRAVENOUS; SUBCUTANEOUS at 13:56

## 2021-07-02 RX ADMIN — Medication 650 MILLIGRAM(S): at 16:49

## 2021-07-02 RX ADMIN — SODIUM CHLORIDE 3 MILLILITER(S): 9 INJECTION INTRAMUSCULAR; INTRAVENOUS; SUBCUTANEOUS at 21:16

## 2021-07-02 RX ADMIN — Medication 400 MILLIGRAM(S): at 11:53

## 2021-07-02 RX ADMIN — HYDROMORPHONE HYDROCHLORIDE 30 MILLILITER(S): 2 INJECTION INTRAMUSCULAR; INTRAVENOUS; SUBCUTANEOUS at 07:16

## 2021-07-02 RX ADMIN — SODIUM CHLORIDE 3 MILLILITER(S): 9 INJECTION INTRAMUSCULAR; INTRAVENOUS; SUBCUTANEOUS at 06:04

## 2021-07-02 RX ADMIN — OXYCODONE HYDROCHLORIDE 10 MILLIGRAM(S): 5 TABLET ORAL at 16:49

## 2021-07-02 RX ADMIN — ENOXAPARIN SODIUM 40 MILLIGRAM(S): 100 INJECTION SUBCUTANEOUS at 12:40

## 2021-07-02 RX ADMIN — OXYCODONE HYDROCHLORIDE 10 MILLIGRAM(S): 5 TABLET ORAL at 13:45

## 2021-07-02 RX ADMIN — Medication 400 MILLIGRAM(S): at 05:26

## 2021-07-02 RX ADMIN — OXYCODONE HYDROCHLORIDE 10 MILLIGRAM(S): 5 TABLET ORAL at 17:45

## 2021-07-02 RX ADMIN — Medication 650 MILLIGRAM(S): at 17:45

## 2021-07-02 RX ADMIN — Medication 15 MILLIGRAM(S): at 10:35

## 2021-07-02 RX ADMIN — OXYCODONE HYDROCHLORIDE 10 MILLIGRAM(S): 5 TABLET ORAL at 13:04

## 2021-07-02 RX ADMIN — Medication 15 MILLIGRAM(S): at 10:50

## 2021-07-02 RX ADMIN — Medication 1000 MILLIGRAM(S): at 11:53

## 2021-07-02 NOTE — PROGRESS NOTE ADULT - ASSESSMENT
Pt is 60 YO man hx of left portal vein thrombosis and stone obstructing CBD s/p ERCP and biliary stent placement, perforated cholecystitis s/p POD1 cholecystostomy placement. Pt was scheduled for open cholecystectomy however due to large amounts of adhesion gallbladder was unable to be removed. Drain was placed in gallbladder, about 25mL serosanguineous discharge.     Plan:  - Pain control with Hydromorphone PCA, 25 ug Fentanyl IV push PRN for mod pain, 0.5mg Hydromorphone IV push PRN for severe pain, Acetamenophen 1g IV q6h. Will wean PCA when ready.   - DVT ppx Lovenox subq  - Cefotetan 2mg IV x1 dose left  - Tolerating regular diet well  - OOB and ambulating as tolerated

## 2021-07-02 NOTE — PROGRESS NOTE ADULT - SUBJECTIVE AND OBJECTIVE BOX
Anesthesia Pain Management Service    SUBJECTIVE: Patient states his pain is managed well with IV PCA.     Pain Scale Score	At rest: ___ 	With Activity: ___ 	[X ] Refer to charted pain scores    THERAPY:    [ ] IV PCA Morphine		[ ] 5 mg/mL	[ ] 1 mg/mL  [X ] IV PCA Hydromorphone	[ ] 5 mg/mL	[X ] 1 mg/mL  [ ] IV PCA Fentanyl		[ ] 50 micrograms/mL    Demand dose __0.2_ lockout __6_ (minutes) Continuous Rate _0__ Total: _3.8__   mg used (in past 24 hrs)      MEDICATIONS  (STANDING):  acetaminophen  IVPB .. 1000 milliGRAM(s) IV Intermittent every 6 hours  enoxaparin Injectable 40 milliGRAM(s) SubCutaneous daily  sodium chloride 0.9% lock flush 3 milliLiter(s) IV Push every 8 hours  sodium chloride 0.9%. 1000 milliLiter(s) (20 mL/Hr) IV Continuous <Continuous>    MEDICATIONS  (PRN):  ketorolac   Injectable 15 milliGRAM(s) IV Push every 8 hours PRN Moderate Pain (4 - 6)  naloxone Injectable 0.1 milliGRAM(s) IV Push every 3 minutes PRN For ANY of the following changes in patient status:  A. RR LESS THAN 10 breaths per minute, B. Oxygen saturation LESS THAN 90%, C. Sedation score of 6  oxyCODONE    IR 5 milliGRAM(s) Oral every 3 hours PRN Moderate Pain (4 - 6)  oxyCODONE    IR 10 milliGRAM(s) Oral every 3 hours PRN Severe Pain (7 - 10)      OBJECTIVE: Patient sitting up in bed. Tolerating regular diet.    Sedation Score:	[ X] Alert	[ ] Drowsy 	[ ] Arousable	[ ] Asleep	[ ] Unresponsive    Side Effects:	[X ] None	[ ] Nausea	[ ] Vomiting	[ ] Pruritus  		[ ] Other:    Vital Signs Last 24 Hrs  T(C): 36.9 (02 Jul 2021 05:30), Max: 36.9 (02 Jul 2021 05:30)  T(F): 98.5 (02 Jul 2021 05:30), Max: 98.5 (02 Jul 2021 05:30)  HR: 80 (02 Jul 2021 05:30) (76 - 99)  BP: 149/77 (02 Jul 2021 05:30) (113/81 - 180/107)  BP(mean): 106 (01 Jul 2021 16:35) (99 - 121)  RR: 17 (02 Jul 2021 05:30) (9 - 17)  SpO2: 98% (02 Jul 2021 05:30) (94% - 99%)    ASSESSMENT/ PLAN    Therapy to  be:	[ ] Continue   [ X] Discontinued   [X ] Change to prn Analgesics    Documentation and Verification of current medications:   [X] Done	[ ] Not done, not elligible    Comments: Discussed patient with primary team. IV PCA discontinued.  PRN Oral/IV opioids and/or Adjuvant non-opioid medication to be ordered at this point.    Progress Note written now but Patient was seen earlier.

## 2021-07-02 NOTE — PROGRESS NOTE ADULT - SUBJECTIVE AND OBJECTIVE BOX
Day __2_ of Anesthesia Pain Management Service    SUBJECTIVE:    Therapy:	  [x ] IV PCA	   [ ] Epidural           [ ] s/p Spinal Opoid              [ ] Postpartum infusion	  [ ] Patient controlled regional anesthesia (PCRA)    [ ] prn Analgesics    OBJECTIVE:   [x ] No new signs     [ ] Other:    Side Effects:  [ x] None			[ ] Other:    Assessment of Catheter Site:		[ ] Intact		[ ] Other:    ASSESSMENT/PLAN  [x ] Continue current therapy    [ ] Therapy changed to:    [ ] IV PCA       [ ] Epidural     [ ] prn Analgesics     Comments:

## 2021-07-02 NOTE — PROGRESS NOTE ADULT - SUBJECTIVE AND OBJECTIVE BOX
S: Pt seen and examined. Denies any new complaints overnight. States pain controlled with PCA pump. Tolerating regular diet well, plan to remove montgomery today.                MEDICATIONS  (STANDING):  acetaminophen  IVPB .. 1000 milliGRAM(s) IV Intermittent every 6 hours  cefoTEtan  IVPB 2 Gram(s) IV Intermittent every 12 hours  enoxaparin Injectable 40 milliGRAM(s) SubCutaneous daily  HYDROmorphone PCA (1 mG/mL) 30 milliLiter(s) PCA Continuous PCA Continuous  sodium chloride 0.9% lock flush 3 milliLiter(s) IV Push every 8 hours  sodium chloride 0.9%. 1000 milliLiter(s) (20 mL/Hr) IV Continuous <Continuous>    MEDICATIONS  (PRN):  ketorolac   Injectable 15 milliGRAM(s) IV Push every 8 hours PRN Moderate Pain (4 - 6)  naloxone Injectable 0.1 milliGRAM(s) IV Push every 3 minutes PRN For ANY of the following changes in patient status:  A. RR LESS THAN 10 breaths per minute, B. Oxygen saturation LESS THAN 90%, C. Sedation score of 6      Vital Signs Last 24 Hrs  T(C): 36.9 (02 Jul 2021 05:30), Max: 36.9 (02 Jul 2021 05:30)  T(F): 98.5 (02 Jul 2021 05:30), Max: 98.5 (02 Jul 2021 05:30)  HR: 80 (02 Jul 2021 05:30) (74 - 99)  BP: 149/77 (02 Jul 2021 05:30) (113/81 - 180/107)  BP(mean): 106 (01 Jul 2021 16:35) (85 - 121)  RR: 17 (02 Jul 2021 05:30) (9 - 17)  SpO2: 98% (02 Jul 2021 05:30) (94% - 99%)      I&O's Summary    01 Jul 2021 07:01  -  02 Jul 2021 07:00  --------------------------------------------------------  IN: 915 mL / OUT: 2665 mL / NET: -1750 mL      I&O's Detail    01 Jul 2021 07:01  -  02 Jul 2021 07:00  --------------------------------------------------------  IN:    IV PiggyBack: 100 mL    Lactated Ringers: 375 mL    Oral Fluid: 240 mL    sodium chloride 0.9%: 200 mL  Total IN: 915 mL    OUT:    Bulb (mL): 40 mL    Indwelling Catheter - Urethral (mL): 2625 mL  Total OUT: 2665 mL    Total NET: -1750 mL          General Appearance: Appears well, NAD  Neck: Supple  Chest: Equal expansion bilaterally, equal breath sounds  CV: Pulse regular presently  Abdomen: Soft, nontense, appropriate incisional tenderness, dressings clean and dry and intact. BC drain 25mL serosanguineous discharge.   Extremities: Grossly symmetric, SCD's in place   .  .  .  .  .

## 2021-07-03 LAB
-  AMIKACIN: SIGNIFICANT CHANGE UP
-  AMOXICILLIN/CLAVULANIC ACID: SIGNIFICANT CHANGE UP
-  AMPICILLIN/SULBACTAM: SIGNIFICANT CHANGE UP
-  AMPICILLIN: SIGNIFICANT CHANGE UP
-  AZTREONAM: SIGNIFICANT CHANGE UP
-  CEFAZOLIN: SIGNIFICANT CHANGE UP
-  CEFEPIME: SIGNIFICANT CHANGE UP
-  CEFOXITIN: SIGNIFICANT CHANGE UP
-  CEFTRIAXONE: SIGNIFICANT CHANGE UP
-  CIPROFLOXACIN: SIGNIFICANT CHANGE UP
-  ERTAPENEM: SIGNIFICANT CHANGE UP
-  GENTAMICIN: SIGNIFICANT CHANGE UP
-  IMIPENEM: SIGNIFICANT CHANGE UP
-  LEVOFLOXACIN: SIGNIFICANT CHANGE UP
-  MEROPENEM: SIGNIFICANT CHANGE UP
-  PIPERACILLIN/TAZOBACTAM: SIGNIFICANT CHANGE UP
-  TOBRAMYCIN: SIGNIFICANT CHANGE UP
-  TRIMETHOPRIM/SULFAMETHOXAZOLE: SIGNIFICANT CHANGE UP
ALBUMIN SERPL ELPH-MCNC: 4.2 G/DL — SIGNIFICANT CHANGE UP (ref 3.3–5)
ALP SERPL-CCNC: 74 U/L — SIGNIFICANT CHANGE UP (ref 40–120)
ALT FLD-CCNC: 57 U/L — HIGH (ref 4–41)
ANION GAP SERPL CALC-SCNC: 13 MMOL/L — SIGNIFICANT CHANGE UP (ref 7–14)
AST SERPL-CCNC: 40 U/L — SIGNIFICANT CHANGE UP (ref 4–40)
BILIRUB SERPL-MCNC: 1 MG/DL — SIGNIFICANT CHANGE UP (ref 0.2–1.2)
BUN SERPL-MCNC: 17 MG/DL — SIGNIFICANT CHANGE UP (ref 7–23)
CALCIUM SERPL-MCNC: 10 MG/DL — SIGNIFICANT CHANGE UP (ref 8.4–10.5)
CHLORIDE SERPL-SCNC: 99 MMOL/L — SIGNIFICANT CHANGE UP (ref 98–107)
CO2 SERPL-SCNC: 25 MMOL/L — SIGNIFICANT CHANGE UP (ref 22–31)
CREAT SERPL-MCNC: 1 MG/DL — SIGNIFICANT CHANGE UP (ref 0.5–1.3)
GLUCOSE SERPL-MCNC: 127 MG/DL — HIGH (ref 70–99)
HCT VFR BLD CALC: 40.4 % — SIGNIFICANT CHANGE UP (ref 39–50)
HGB BLD-MCNC: 13.5 G/DL — SIGNIFICANT CHANGE UP (ref 13–17)
MAGNESIUM SERPL-MCNC: 2.1 MG/DL — SIGNIFICANT CHANGE UP (ref 1.6–2.6)
MCHC RBC-ENTMCNC: 30.3 PG — SIGNIFICANT CHANGE UP (ref 27–34)
MCHC RBC-ENTMCNC: 33.4 GM/DL — SIGNIFICANT CHANGE UP (ref 32–36)
MCV RBC AUTO: 90.8 FL — SIGNIFICANT CHANGE UP (ref 80–100)
METHOD TYPE: SIGNIFICANT CHANGE UP
NRBC # BLD: 0 /100 WBCS — SIGNIFICANT CHANGE UP
NRBC # FLD: 0 K/UL — SIGNIFICANT CHANGE UP
PHOSPHATE SERPL-MCNC: 3.3 MG/DL — SIGNIFICANT CHANGE UP (ref 2.5–4.5)
PLATELET # BLD AUTO: 212 K/UL — SIGNIFICANT CHANGE UP (ref 150–400)
POTASSIUM SERPL-MCNC: 4 MMOL/L — SIGNIFICANT CHANGE UP (ref 3.5–5.3)
POTASSIUM SERPL-SCNC: 4 MMOL/L — SIGNIFICANT CHANGE UP (ref 3.5–5.3)
PROT SERPL-MCNC: 7 G/DL — SIGNIFICANT CHANGE UP (ref 6–8.3)
RBC # BLD: 4.45 M/UL — SIGNIFICANT CHANGE UP (ref 4.2–5.8)
RBC # FLD: 13.7 % — SIGNIFICANT CHANGE UP (ref 10.3–14.5)
SODIUM SERPL-SCNC: 137 MMOL/L — SIGNIFICANT CHANGE UP (ref 135–145)
WBC # BLD: 9.9 K/UL — SIGNIFICANT CHANGE UP (ref 3.8–10.5)
WBC # FLD AUTO: 9.9 K/UL — SIGNIFICANT CHANGE UP (ref 3.8–10.5)

## 2021-07-03 RX ORDER — IBUPROFEN 200 MG
400 TABLET ORAL EVERY 6 HOURS
Refills: 0 | Status: DISCONTINUED | OUTPATIENT
Start: 2021-07-03 | End: 2021-07-05

## 2021-07-03 RX ORDER — ACETAMINOPHEN 500 MG
975 TABLET ORAL EVERY 6 HOURS
Refills: 0 | Status: DISCONTINUED | OUTPATIENT
Start: 2021-07-03 | End: 2021-07-05

## 2021-07-03 RX ADMIN — OXYCODONE HYDROCHLORIDE 10 MILLIGRAM(S): 5 TABLET ORAL at 10:40

## 2021-07-03 RX ADMIN — Medication 650 MILLIGRAM(S): at 13:15

## 2021-07-03 RX ADMIN — Medication 650 MILLIGRAM(S): at 12:49

## 2021-07-03 RX ADMIN — OXYCODONE HYDROCHLORIDE 10 MILLIGRAM(S): 5 TABLET ORAL at 09:51

## 2021-07-03 RX ADMIN — Medication 650 MILLIGRAM(S): at 17:43

## 2021-07-03 RX ADMIN — SODIUM CHLORIDE 3 MILLILITER(S): 9 INJECTION INTRAMUSCULAR; INTRAVENOUS; SUBCUTANEOUS at 14:35

## 2021-07-03 RX ADMIN — Medication 650 MILLIGRAM(S): at 04:31

## 2021-07-03 RX ADMIN — OXYCODONE HYDROCHLORIDE 5 MILLIGRAM(S): 5 TABLET ORAL at 17:44

## 2021-07-03 RX ADMIN — OXYCODONE HYDROCHLORIDE 5 MILLIGRAM(S): 5 TABLET ORAL at 18:30

## 2021-07-03 RX ADMIN — Medication 650 MILLIGRAM(S): at 18:30

## 2021-07-03 RX ADMIN — ENOXAPARIN SODIUM 40 MILLIGRAM(S): 100 INJECTION SUBCUTANEOUS at 12:50

## 2021-07-03 RX ADMIN — SODIUM CHLORIDE 3 MILLILITER(S): 9 INJECTION INTRAMUSCULAR; INTRAVENOUS; SUBCUTANEOUS at 22:09

## 2021-07-03 RX ADMIN — SODIUM CHLORIDE 3 MILLILITER(S): 9 INJECTION INTRAMUSCULAR; INTRAVENOUS; SUBCUTANEOUS at 06:00

## 2021-07-03 NOTE — PROGRESS NOTE ADULT - SUBJECTIVE AND OBJECTIVE BOX
Subjective:   Patient seen at bedside this AM. Reports feeling well, without complaints. Denies chest pain, SOB. Tolerating diet without N/V.     24h Events:   - Overnight, no acute events    Objective:  Vital Signs  T(C): 36.9 (07-03 @ 04:30), Max: 37.1 (07-02 @ 12:04)  HR: 83 (07-03 @ 04:30) (76 - 84)  BP: 140/83 (07-03 @ 04:30) (140/83 - 151/72)  RR: 17 (07-03 @ 04:30) (16 - 18)  SpO2: 97% (07-03 @ 04:30) (95% - 98%)  07-01-21 @ 07:01  -  07-02-21 @ 07:00  --------------------------------------------------------  IN:  Total IN: 0 mL    OUT:    Bulb (mL): 40 mL    Indwelling Catheter - Urethral (mL): 2625 mL  Total OUT: 2665 mL    Total NET: -2665 mL      07-02-21 @ 07:01  -  07-03-21 @ 04:39  --------------------------------------------------------  IN:  Total IN: 0 mL    OUT:    Bulb (mL): 33 mL    Indwelling Catheter - Urethral (mL): 500 mL    Voided (mL): 2625 mL  Total OUT: 3158 mL    Total NET: -3158 mL          Physical Exam:  GEN: resting in bed comfortably in NAD  RESP: no increased WOB  ABD: soft, non-distended, non-tender without rebound tenderness or guarding. Incision sites clean, dry, and intact without erythema or edema.  EXTR: warm, well-perfused, no edema  NEURO: awake, alert    Labs:                        14.7   17.69 )-----------( 215      ( 02 Jul 2021 07:30 )             43.5   07-02    137  |  100  |  13  ----------------------------<  120<H>  4.3   |  21<L>  |  0.85    Ca    9.8      02 Jul 2021 07:30  Phos  3.2     07-02  Mg     2.10     07-02    TPro  7.4  /  Alb  4.4  /  TBili  1.4<H>  /  DBili  x   /  AST  44<H>  /  ALT  65<H>  /  AlkPhos  78  07-02    CAPILLARY BLOOD GLUCOSE          Medications:   MEDICATIONS  (STANDING):  acetaminophen   Tablet .. 650 milliGRAM(s) Oral every 6 hours  enoxaparin Injectable 40 milliGRAM(s) SubCutaneous daily  sodium chloride 0.9% lock flush 3 milliLiter(s) IV Push every 8 hours    MEDICATIONS  (PRN):  ketorolac   Injectable 15 milliGRAM(s) IV Push every 8 hours PRN Moderate Pain (4 - 6)  naloxone Injectable 0.1 milliGRAM(s) IV Push every 3 minutes PRN For ANY of the following changes in patient status:  A. RR LESS THAN 10 breaths per minute, B. Oxygen saturation LESS THAN 90%, C. Sedation score of 6  oxyCODONE    IR 5 milliGRAM(s) Oral every 3 hours PRN Moderate Pain (4 - 6)  oxyCODONE    IR 10 milliGRAM(s) Oral every 3 hours PRN Severe Pain (7 - 10)       Subjective:   Patient seen at bedside this AM. Reports feeling well but a bit "sore," without other complaints. Denies chest pain, SOB. Tolerating regular diet without N/V.     24h Events:   - Overnight, no acute events    Objective:  Vital Signs  T(C): 36.9 (07-03 @ 04:30), Max: 37.1 (07-02 @ 12:04)  HR: 83 (07-03 @ 04:30) (76 - 84)  BP: 140/83 (07-03 @ 04:30) (140/83 - 151/72)  RR: 17 (07-03 @ 04:30) (16 - 18)  SpO2: 97% (07-03 @ 04:30) (95% - 98%)  07-01-21 @ 07:01  -  07-02-21 @ 07:00  --------------------------------------------------------  IN:  Total IN: 0 mL    OUT:    Bulb (mL): 40 mL    Indwelling Catheter - Urethral (mL): 2625 mL  Total OUT: 2665 mL    Total NET: -2665 mL      07-02-21 @ 07:01  -  07-03-21 @ 04:39  --------------------------------------------------------  IN:  Total IN: 0 mL    OUT:    Bulb (mL): 33 mL    Indwelling Catheter - Urethral (mL): 500 mL    Voided (mL): 2625 mL  Total OUT: 3158 mL    Total NET: -3158 mL          Physical Exam:  GEN: resting in bed comfortably in NAD  RESP: no increased WOB  ABD: soft, non-distended, non-tender without rebound tenderness or guarding. Incision sites clean, dry, and intact without erythema or edema.  EXTR: warm, well-perfused, no edema  NEURO: awake, alert    Labs:                        14.7   17.69 )-----------( 215      ( 02 Jul 2021 07:30 )             43.5   07-02    137  |  100  |  13  ----------------------------<  120<H>  4.3   |  21<L>  |  0.85    Ca    9.8      02 Jul 2021 07:30  Phos  3.2     07-02  Mg     2.10     07-02    TPro  7.4  /  Alb  4.4  /  TBili  1.4<H>  /  DBili  x   /  AST  44<H>  /  ALT  65<H>  /  AlkPhos  78  07-02    CAPILLARY BLOOD GLUCOSE          Medications:   MEDICATIONS  (STANDING):  acetaminophen   Tablet .. 650 milliGRAM(s) Oral every 6 hours  enoxaparin Injectable 40 milliGRAM(s) SubCutaneous daily  sodium chloride 0.9% lock flush 3 milliLiter(s) IV Push every 8 hours    MEDICATIONS  (PRN):  ketorolac   Injectable 15 milliGRAM(s) IV Push every 8 hours PRN Moderate Pain (4 - 6)  naloxone Injectable 0.1 milliGRAM(s) IV Push every 3 minutes PRN For ANY of the following changes in patient status:  A. RR LESS THAN 10 breaths per minute, B. Oxygen saturation LESS THAN 90%, C. Sedation score of 6  oxyCODONE    IR 5 milliGRAM(s) Oral every 3 hours PRN Moderate Pain (4 - 6)  oxyCODONE    IR 10 milliGRAM(s) Oral every 3 hours PRN Severe Pain (7 - 10)       Subjective:   Patient seen at bedside this AM. Reports feeling well but a bit "sore," without other complaints. Denies chest pain, SOB. Tolerating regular diet without N/V.     24h Events:   - Overnight, no acute events. WBC 9.90 this AM, down from 17 yesterday AM.    Objective:  Vital Signs  T(C): 36.9 (07-03 @ 04:30), Max: 37.1 (07-02 @ 12:04)  HR: 83 (07-03 @ 04:30) (76 - 84)  BP: 140/83 (07-03 @ 04:30) (140/83 - 151/72)  RR: 17 (07-03 @ 04:30) (16 - 18)  SpO2: 97% (07-03 @ 04:30) (95% - 98%)  07-01-21 @ 07:01  -  07-02-21 @ 07:00  --------------------------------------------------------  IN:  Total IN: 0 mL    OUT:    Bulb (mL): 40 mL    Indwelling Catheter - Urethral (mL): 2625 mL  Total OUT: 2665 mL    Total NET: -2665 mL      07-02-21 @ 07:01  -  07-03-21 @ 04:39  --------------------------------------------------------  IN:  Total IN: 0 mL    OUT:    Bulb (mL): 33 mL    Indwelling Catheter - Urethral (mL): 500 mL    Voided (mL): 2625 mL  Total OUT: 3158 mL    Total NET: -3158 mL          Physical Exam:  GEN: resting in bed comfortably in NAD  RESP: no increased WOB  ABD: soft, non-distended, non-tender without rebound tenderness or guarding. Incision sites clean, dry, and intact without erythema or edema.  EXTR: warm, well-perfused, no edema  NEURO: awake, alert    Labs:                        14.7   17.69 )-----------( 215      ( 02 Jul 2021 07:30 )             43.5   07-02    137  |  100  |  13  ----------------------------<  120<H>  4.3   |  21<L>  |  0.85    Ca    9.8      02 Jul 2021 07:30  Phos  3.2     07-02  Mg     2.10     07-02    TPro  7.4  /  Alb  4.4  /  TBili  1.4<H>  /  DBili  x   /  AST  44<H>  /  ALT  65<H>  /  AlkPhos  78  07-02    CAPILLARY BLOOD GLUCOSE          Medications:   MEDICATIONS  (STANDING):  acetaminophen   Tablet .. 650 milliGRAM(s) Oral every 6 hours  enoxaparin Injectable 40 milliGRAM(s) SubCutaneous daily  sodium chloride 0.9% lock flush 3 milliLiter(s) IV Push every 8 hours    MEDICATIONS  (PRN):  ketorolac   Injectable 15 milliGRAM(s) IV Push every 8 hours PRN Moderate Pain (4 - 6)  naloxone Injectable 0.1 milliGRAM(s) IV Push every 3 minutes PRN For ANY of the following changes in patient status:  A. RR LESS THAN 10 breaths per minute, B. Oxygen saturation LESS THAN 90%, C. Sedation score of 6  oxyCODONE    IR 5 milliGRAM(s) Oral every 3 hours PRN Moderate Pain (4 - 6)  oxyCODONE    IR 10 milliGRAM(s) Oral every 3 hours PRN Severe Pain (7 - 10)       Subjective:   Patient seen at bedside this AM. Reports feeling well but a bit "sore," without other complaints. Denies chest pain, SOB. Tolerating regular diet without N/V. No flatus, no BM.    24h Events:   - Overnight, no acute events. WBC 9.90 this AM, down from 17 yesterday AM.    Objective:  Vital Signs  T(C): 36.9 (07-03 @ 04:30), Max: 37.1 (07-02 @ 12:04)  HR: 83 (07-03 @ 04:30) (76 - 84)  BP: 140/83 (07-03 @ 04:30) (140/83 - 151/72)  RR: 17 (07-03 @ 04:30) (16 - 18)  SpO2: 97% (07-03 @ 04:30) (95% - 98%)  07-01-21 @ 07:01  -  07-02-21 @ 07:00  --------------------------------------------------------  IN:  Total IN: 0 mL    OUT:    Bulb (mL): 40 mL    Indwelling Catheter - Urethral (mL): 2625 mL  Total OUT: 2665 mL    Total NET: -2665 mL      07-02-21 @ 07:01  -  07-03-21 @ 04:39  --------------------------------------------------------  IN:  Total IN: 0 mL    OUT:    Bulb (mL): 33 mL    Indwelling Catheter - Urethral (mL): 500 mL    Voided (mL): 2625 mL  Total OUT: 3158 mL    Total NET: -3158 mL          Physical Exam:  GEN: resting in bed comfortably in NAD  RESP: no increased WOB  ABD: soft, non-distended, non-tender without rebound tenderness or guarding. Incision sites clean, dry, and intact without erythema or edema.  EXTR: warm, well-perfused, no edema  NEURO: awake, alert    Labs:                        14.7   17.69 )-----------( 215      ( 02 Jul 2021 07:30 )             43.5   07-02    137  |  100  |  13  ----------------------------<  120<H>  4.3   |  21<L>  |  0.85    Ca    9.8      02 Jul 2021 07:30  Phos  3.2     07-02  Mg     2.10     07-02    TPro  7.4  /  Alb  4.4  /  TBili  1.4<H>  /  DBili  x   /  AST  44<H>  /  ALT  65<H>  /  AlkPhos  78  07-02    CAPILLARY BLOOD GLUCOSE          Medications:   MEDICATIONS  (STANDING):  acetaminophen   Tablet .. 650 milliGRAM(s) Oral every 6 hours  enoxaparin Injectable 40 milliGRAM(s) SubCutaneous daily  sodium chloride 0.9% lock flush 3 milliLiter(s) IV Push every 8 hours    MEDICATIONS  (PRN):  ketorolac   Injectable 15 milliGRAM(s) IV Push every 8 hours PRN Moderate Pain (4 - 6)  naloxone Injectable 0.1 milliGRAM(s) IV Push every 3 minutes PRN For ANY of the following changes in patient status:  A. RR LESS THAN 10 breaths per minute, B. Oxygen saturation LESS THAN 90%, C. Sedation score of 6  oxyCODONE    IR 5 milliGRAM(s) Oral every 3 hours PRN Moderate Pain (4 - 6)  oxyCODONE    IR 10 milliGRAM(s) Oral every 3 hours PRN Severe Pain (7 - 10)

## 2021-07-03 NOTE — PROGRESS NOTE ADULT - ASSESSMENT
Pt is 62 YO man hx of left portal vein thrombosis and stone obstructing CBD s/p ERCP and biliary stent placement, perforated cholecystitis s/p cholecystostomy placement POD2.     Plan:  - Pain control with Hydromorphone PCA, 25 ug Fentanyl IV push PRN for mod pain, 0.5mg Hydromorphone IV push PRN for severe pain, Acetamenophen 1g IV q6h. Will wean PCA when ready.   - DVT ppx Lovenox subq  - Cefotetan 2mg IV x1 dose left  - Tolerating regular diet well  - OOB and ambulating as tolerated Pt is 62 YO man hx of left portal vein thrombosis and stone obstructing CBD s/p ERCP and biliary stent placement, perforated cholecystitis s/p cholecystostomy placement POD2.     Plan:  - Pain control with Toradol 15 mg, Tylenol 650 mg, oxycodone  - DVT ppx Lovenox subq  - Tolerating regular diet well  - OOB and ambulating as tolerated    Josue Bernstein MD  PGY-1 Pt is 60 YO man hx of left portal vein thrombosis and stone obstructing CBD s/p ERCP and biliary stent placement, perforated cholecystitis s/p cholecystostomy placement POD2.    Plan:  - Pain control with Toradol 15 mg, Tylenol 650 mg, oxycodone  - DVT ppx Lovenox subq  - Tolerating regular diet well  - OOB and ambulating as tolerated    Josue Bernstein, PGY-1  D Team Surgery  #[id number] Pt is 62 YO man hx of left portal vein thrombosis and stone obstructing CBD s/p ERCP and biliary stent placement, perforated cholecystitis s/p cholecystostomy placement POD2.    Plan:  - Pain control with Toradol 15 mg, Tylenol 650 mg, oxycodone  - DVT ppx Lovenox subq  - Tolerating regular diet well  - OOB and ambulating as tolerated    Seen and examined with Dr. Zavaleta.    Josue Bernstein, PGY-1  D Team Surgery  #[id number] Pt is 62 YO man hx of left portal vein thrombosis and stone obstructing CBD s/p ERCP and biliary stent placement, perforated cholecystitis s/p cholecystostomy placement POD2.    Plan:  - Pain control with Toradol 15 mg, Tylenol 650 mg, oxycodone  - DVT ppx Lovenox subq  - Tolerating regular diet well  - OOB and ambulating as tolerated    Josue Bernstein, PGY-1  D Team Surgery  #[id number]

## 2021-07-04 ENCOUNTER — TRANSCRIPTION ENCOUNTER (OUTPATIENT)
Age: 61
End: 2021-07-04

## 2021-07-04 LAB
ANION GAP SERPL CALC-SCNC: 14 MMOL/L — SIGNIFICANT CHANGE UP (ref 7–14)
BUN SERPL-MCNC: 16 MG/DL — SIGNIFICANT CHANGE UP (ref 7–23)
CALCIUM SERPL-MCNC: 9.8 MG/DL — SIGNIFICANT CHANGE UP (ref 8.4–10.5)
CHLORIDE SERPL-SCNC: 98 MMOL/L — SIGNIFICANT CHANGE UP (ref 98–107)
CO2 SERPL-SCNC: 20 MMOL/L — LOW (ref 22–31)
CREAT SERPL-MCNC: 0.87 MG/DL — SIGNIFICANT CHANGE UP (ref 0.5–1.3)
GLUCOSE SERPL-MCNC: 110 MG/DL — HIGH (ref 70–99)
HCT VFR BLD CALC: 41.5 % — SIGNIFICANT CHANGE UP (ref 39–50)
HGB BLD-MCNC: 14.1 G/DL — SIGNIFICANT CHANGE UP (ref 13–17)
MAGNESIUM SERPL-MCNC: 2.1 MG/DL — SIGNIFICANT CHANGE UP (ref 1.6–2.6)
MCHC RBC-ENTMCNC: 30 PG — SIGNIFICANT CHANGE UP (ref 27–34)
MCHC RBC-ENTMCNC: 34 GM/DL — SIGNIFICANT CHANGE UP (ref 32–36)
MCV RBC AUTO: 88.3 FL — SIGNIFICANT CHANGE UP (ref 80–100)
NRBC # BLD: 0 /100 WBCS — SIGNIFICANT CHANGE UP
NRBC # FLD: 0 K/UL — SIGNIFICANT CHANGE UP
PHOSPHATE SERPL-MCNC: 3.4 MG/DL — SIGNIFICANT CHANGE UP (ref 2.5–4.5)
PLATELET # BLD AUTO: 224 K/UL — SIGNIFICANT CHANGE UP (ref 150–400)
POTASSIUM SERPL-MCNC: 3.5 MMOL/L — SIGNIFICANT CHANGE UP (ref 3.5–5.3)
POTASSIUM SERPL-SCNC: 3.5 MMOL/L — SIGNIFICANT CHANGE UP (ref 3.5–5.3)
RBC # BLD: 4.7 M/UL — SIGNIFICANT CHANGE UP (ref 4.2–5.8)
RBC # FLD: 13.3 % — SIGNIFICANT CHANGE UP (ref 10.3–14.5)
SODIUM SERPL-SCNC: 132 MMOL/L — LOW (ref 135–145)
WBC # BLD: 8.7 K/UL — SIGNIFICANT CHANGE UP (ref 3.8–10.5)
WBC # FLD AUTO: 8.7 K/UL — SIGNIFICANT CHANGE UP (ref 3.8–10.5)

## 2021-07-04 RX ORDER — POTASSIUM CHLORIDE 20 MEQ
20 PACKET (EA) ORAL
Refills: 0 | Status: COMPLETED | OUTPATIENT
Start: 2021-07-04 | End: 2021-07-04

## 2021-07-04 RX ORDER — OXYCODONE HYDROCHLORIDE 5 MG/1
1 TABLET ORAL
Qty: 15 | Refills: 0
Start: 2021-07-04

## 2021-07-04 RX ADMIN — OXYCODONE HYDROCHLORIDE 5 MILLIGRAM(S): 5 TABLET ORAL at 05:16

## 2021-07-04 RX ADMIN — SODIUM CHLORIDE 3 MILLILITER(S): 9 INJECTION INTRAMUSCULAR; INTRAVENOUS; SUBCUTANEOUS at 22:03

## 2021-07-04 RX ADMIN — OXYCODONE HYDROCHLORIDE 5 MILLIGRAM(S): 5 TABLET ORAL at 04:46

## 2021-07-04 RX ADMIN — Medication 20 MILLIEQUIVALENT(S): at 10:39

## 2021-07-04 RX ADMIN — ENOXAPARIN SODIUM 40 MILLIGRAM(S): 100 INJECTION SUBCUTANEOUS at 11:58

## 2021-07-04 RX ADMIN — SODIUM CHLORIDE 3 MILLILITER(S): 9 INJECTION INTRAMUSCULAR; INTRAVENOUS; SUBCUTANEOUS at 04:47

## 2021-07-04 RX ADMIN — Medication 20 MILLIEQUIVALENT(S): at 11:58

## 2021-07-04 RX ADMIN — SODIUM CHLORIDE 3 MILLILITER(S): 9 INJECTION INTRAMUSCULAR; INTRAVENOUS; SUBCUTANEOUS at 13:09

## 2021-07-04 RX ADMIN — OXYCODONE HYDROCHLORIDE 5 MILLIGRAM(S): 5 TABLET ORAL at 13:37

## 2021-07-04 RX ADMIN — OXYCODONE HYDROCHLORIDE 5 MILLIGRAM(S): 5 TABLET ORAL at 14:14

## 2021-07-04 NOTE — DISCHARGE NOTE PROVIDER - NSDCMRMEDTOKEN_GEN_ALL_CORE_FT
Eliquis 5 mg oral tablet: 1 tab(s) orally 2 times a day  oxyCODONE 5 mg oral tablet: 1 tab(s) orally every 6 to 8 hours, As Needed -for severe pain MDD:3 tabs

## 2021-07-04 NOTE — DISCHARGE NOTE PROVIDER - HOSPITAL COURSE
The patient was admitted to undergo an open cholecystectomy on 7/1/21. During the surgery, very dense adhesions of omentum to the liver and gallbladder made it too dangerous to try and remove the entire gallbladder, so a cholecystostomy tube was placed. The patient tolerated the procedure well. Post-operatively, he tolerated a regular diet by POD2. His pain was well-controlled with oral analgesics by POD3. His cholecystostomy tube drained serosanguinous fluid in the first 4 days post-op.

## 2021-07-04 NOTE — PROGRESS NOTE ADULT - ASSESSMENT
Pt is 61M hx of left portal vein thrombosis and stone obstructing CBD s/p ERCP and biliary stent placement, perforated cholecystitis now s/p open cholecystostomy placement on 7/1. Recovering well.    Plan:  - Pain control with oral meds  - DVT ppx Lovenox subq  - Continue regular diet  - OOB and ambulating as tolerated  - IS    Naldo Ward, PGY-5  D Team Surgery [id number]

## 2021-07-04 NOTE — PROGRESS NOTE ADULT - SUBJECTIVE AND OBJECTIVE BOX
Surgery Progress Note    S: Patient seen and examined. No acute events overnight. Patient's pain is relatively well controlled with oral meds. Tolerating regular diet. No flatus or BM yet. Ambulating.    O:  Vital Signs Last 24 Hrs  T(C): 36.7 (04 Jul 2021 08:20), Max: 37.4 (03 Jul 2021 16:00)  T(F): 98.1 (04 Jul 2021 08:20), Max: 99.3 (03 Jul 2021 16:00)  HR: 74 (04 Jul 2021 08:20) (74 - 85)  BP: 152/82 (04 Jul 2021 08:20) (146/78 - 154/84)  BP(mean): --  RR: 16 (04 Jul 2021 08:20) (16 - 18)  SpO2: 96% (04 Jul 2021 08:20) (95% - 99%)    I&O's Detail    03 Jul 2021 07:01  -  04 Jul 2021 07:00  --------------------------------------------------------  IN:    Oral Fluid: 900 mL  Total IN: 900 mL    OUT:    Bulb (mL): 57 mL    Voided (mL): 2525 mL  Total OUT: 2582 mL    Total NET: -1682 mL          MEDICATIONS  (STANDING):  enoxaparin Injectable 40 milliGRAM(s) SubCutaneous daily  sodium chloride 0.9% lock flush 3 milliLiter(s) IV Push every 8 hours    MEDICATIONS  (PRN):  acetaminophen   Tablet .. 975 milliGRAM(s) Oral every 6 hours PRN Mild Pain (1 - 3)  ibuprofen  Tablet. 400 milliGRAM(s) Oral every 6 hours PRN Moderate Pain (4 - 6)  naloxone Injectable 0.1 milliGRAM(s) IV Push every 3 minutes PRN For ANY of the following changes in patient status:  A. RR LESS THAN 10 breaths per minute, B. Oxygen saturation LESS THAN 90%, C. Sedation score of 6  oxyCODONE    IR 5 milliGRAM(s) Oral every 3 hours PRN Moderate Pain (4 - 6)  oxyCODONE    IR 10 milliGRAM(s) Oral every 3 hours PRN Severe Pain (7 - 10)                            14.1   8.70  )-----------( 224      ( 04 Jul 2021 06:23 )             41.5       07-04    132<L>  |  98  |  16  ----------------------------<  110<H>  3.5   |  20<L>  |  0.87    Ca    9.8      04 Jul 2021 06:23  Phos  3.4     07-04  Mg     2.10     07-04    TPro  7.0  /  Alb  4.2  /  TBili  1.0  /  DBili  x   /  AST  40  /  ALT  57<H>  /  AlkPhos  74  07-03      Physical Exam:  Gen: Laying in bed, NAD  Resp: Unlabored breathing  Abd: soft, venkatesh-incisional tenderness, non-distended. Ruddy drain serosanguinous.  Ext: WWP  Skin: No rashes

## 2021-07-04 NOTE — DISCHARGE NOTE PROVIDER - CARE PROVIDER_API CALL
Reg Stovall)  Surgery  [address], [place] [address]  Phone: [phone]  Fax: ()-  Follow Up Time:

## 2021-07-04 NOTE — DISCHARGE NOTE PROVIDER - NSDCCPCAREPLAN_GEN_ALL_CORE_FT
PRINCIPAL DISCHARGE DIAGNOSIS  Diagnosis: Chronic cholecystitis  Assessment and Plan of Treatment:

## 2021-07-04 NOTE — DISCHARGE NOTE PROVIDER - NSDCFUADDINST_GEN_ALL_CORE_FT
WOUND CARE:  Please keep incisions clean and dry. Please do not Scrub or rub incisions. Do not use lotion or powder on incisions.   BATHING: Please do not submerge wound underwater. You may shower and/or sponge bathe.  ACTIVITY: No heavy lifting or straining until your follow up appointment. Otherwise, you may return to your usual level of physical activity. If you are taking narcotic pain medication (such as Percocet) DO NOT drive a car, operate machinery or make important decisions.  DIET: Return to your usual diet.  NOTIFY YOUR SURGEON IF: You have any bleeding that does not stop, any pus draining from your wound(s), any fever (over 100.4 F) or chills, persistent nausea/vomiting, persistent diarrhea, or if your pain is not controlled on your discharge pain medications.  FOLLOW-UP: Please follow-up with your surgeon 2 weeks following discharge- please call to schedule an appointment.   PAIN CONTROL: A prescription for oxycodon has been sent to your pharmacy. You should only take these for severe pain. For mild or moderate pain, you may take 975mg of tylenol every 6 hours. You may also take ibuprofen 400mg every 6 hours. It is most effective to alternate these two medicines with each other, 3 hours apart.

## 2021-07-05 ENCOUNTER — TRANSCRIPTION ENCOUNTER (OUTPATIENT)
Age: 61
End: 2021-07-05

## 2021-07-05 VITALS
RESPIRATION RATE: 17 BRPM | SYSTOLIC BLOOD PRESSURE: 145 MMHG | OXYGEN SATURATION: 97 % | TEMPERATURE: 98 F | HEART RATE: 79 BPM | DIASTOLIC BLOOD PRESSURE: 82 MMHG

## 2021-07-05 LAB
ANION GAP SERPL CALC-SCNC: 13 MMOL/L — SIGNIFICANT CHANGE UP (ref 7–14)
BUN SERPL-MCNC: 16 MG/DL — SIGNIFICANT CHANGE UP (ref 7–23)
CALCIUM SERPL-MCNC: 10.2 MG/DL — SIGNIFICANT CHANGE UP (ref 8.4–10.5)
CHLORIDE SERPL-SCNC: 102 MMOL/L — SIGNIFICANT CHANGE UP (ref 98–107)
CO2 SERPL-SCNC: 23 MMOL/L — SIGNIFICANT CHANGE UP (ref 22–31)
CREAT SERPL-MCNC: 0.81 MG/DL — SIGNIFICANT CHANGE UP (ref 0.5–1.3)
GLUCOSE SERPL-MCNC: 109 MG/DL — HIGH (ref 70–99)
HCT VFR BLD CALC: 43.4 % — SIGNIFICANT CHANGE UP (ref 39–50)
HGB BLD-MCNC: 14.6 G/DL — SIGNIFICANT CHANGE UP (ref 13–17)
MAGNESIUM SERPL-MCNC: 2.2 MG/DL — SIGNIFICANT CHANGE UP (ref 1.6–2.6)
MCHC RBC-ENTMCNC: 29.7 PG — SIGNIFICANT CHANGE UP (ref 27–34)
MCHC RBC-ENTMCNC: 33.6 GM/DL — SIGNIFICANT CHANGE UP (ref 32–36)
MCV RBC AUTO: 88.4 FL — SIGNIFICANT CHANGE UP (ref 80–100)
NRBC # BLD: 0 /100 WBCS — SIGNIFICANT CHANGE UP
NRBC # FLD: 0 K/UL — SIGNIFICANT CHANGE UP
PHOSPHATE SERPL-MCNC: 4.1 MG/DL — SIGNIFICANT CHANGE UP (ref 2.5–4.5)
PLATELET # BLD AUTO: 244 K/UL — SIGNIFICANT CHANGE UP (ref 150–400)
POTASSIUM SERPL-MCNC: 3.8 MMOL/L — SIGNIFICANT CHANGE UP (ref 3.5–5.3)
POTASSIUM SERPL-SCNC: 3.8 MMOL/L — SIGNIFICANT CHANGE UP (ref 3.5–5.3)
RBC # BLD: 4.91 M/UL — SIGNIFICANT CHANGE UP (ref 4.2–5.8)
RBC # FLD: 13.3 % — SIGNIFICANT CHANGE UP (ref 10.3–14.5)
SODIUM SERPL-SCNC: 138 MMOL/L — SIGNIFICANT CHANGE UP (ref 135–145)
WBC # BLD: 8.2 K/UL — SIGNIFICANT CHANGE UP (ref 3.8–10.5)
WBC # FLD AUTO: 8.2 K/UL — SIGNIFICANT CHANGE UP (ref 3.8–10.5)

## 2021-07-05 RX ADMIN — OXYCODONE HYDROCHLORIDE 5 MILLIGRAM(S): 5 TABLET ORAL at 01:14

## 2021-07-05 RX ADMIN — SODIUM CHLORIDE 3 MILLILITER(S): 9 INJECTION INTRAMUSCULAR; INTRAVENOUS; SUBCUTANEOUS at 12:27

## 2021-07-05 RX ADMIN — OXYCODONE HYDROCHLORIDE 5 MILLIGRAM(S): 5 TABLET ORAL at 02:30

## 2021-07-05 RX ADMIN — SODIUM CHLORIDE 3 MILLILITER(S): 9 INJECTION INTRAMUSCULAR; INTRAVENOUS; SUBCUTANEOUS at 04:55

## 2021-07-05 NOTE — PROGRESS NOTE ADULT - ASSESSMENT
Pt is 61M hx of left portal vein thrombosis and stone obstructing CBD s/p ERCP and biliary stent placement, perforated cholecystitis now s/p open cholecystostomy placement on 7/1. Recovering well.    Plan:  - Pain control with oral meds  - OOB and ambulating as tolerated, IS  - Diet: regular  - DVT ppx Lovenox subq    D Team Surgery  [id number]   Pt is 61M hx of left portal vein thrombosis and stone obstructing CBD s/p ERCP and biliary stent placement, perforated cholecystitis now s/p open cholecystostomy placement on 7/1. Recovering well.    Plan:  - Pain control with oral meds  - OOB and ambulating as tolerated, IS  - Diet: regular  - DVT ppx Lovenox subq  - Plan for discharge home today    Naldo Ward, PGY-5  D Team Surgery [id number]

## 2021-07-05 NOTE — DISCHARGE NOTE NURSING/CASE MANAGEMENT/SOCIAL WORK - PATIENT PORTAL LINK FT
You can access the FollowMyHealth Patient Portal offered by [facility] by registering at the following website: [email or web address]. By joining [employer]’s FollowMyHealth portal, you will also be able to view your health information using other applications (apps) compatible with our system.

## 2021-07-05 NOTE — PROGRESS NOTE ADULT - SUBJECTIVE AND OBJECTIVE BOX
Surgery Progress Note    S: Patient seen and examined. No acute events overnight.     O:  Vital Signs (24 Hrs):  T(C): 37.1 (07-05-21 @ 00:06), Max: 37.1 (07-05-21 @ 00:06)  HR: 79 (07-05-21 @ 00:06) (74 - 80)  BP: 147/93 (07-05-21 @ 00:06) (147/93 - 169/84)  RR: 18 (07-05-21 @ 00:06) (16 - 18)  SpO2: 96% (07-05-21 @ 00:06) (96% - 99%)  Wt(kg): --  Daily     Daily     I&O's Summary    03 Jul 2021 07:01  -  04 Jul 2021 07:00  --------------------------------------------------------  IN: 900 mL / OUT: 2582 mL / NET: -1682 mL    04 Jul 2021 07:01  -  05 Jul 2021 01:19  --------------------------------------------------------  IN: 1080 mL / OUT: 2350 mL / NET: -1270 mL      Physical Exam:  Gen: Laying in bed, NAD  Resp: Unlabored breathing  Abd: soft, venkatesh-incisional tenderness, non-distended. Ruddy drain serosanguinous.  Ext: WWP  Skin: No rashes    MEDICATIONS  (STANDING):  enoxaparin Injectable 40 milliGRAM(s) SubCutaneous daily  sodium chloride 0.9% lock flush 3 milliLiter(s) IV Push every 8 hours    MEDICATIONS  (PRN):  acetaminophen   Tablet .. 975 milliGRAM(s) Oral every 6 hours PRN Mild Pain (1 - 3)  ibuprofen  Tablet. 400 milliGRAM(s) Oral every 6 hours PRN Moderate Pain (4 - 6)  naloxone Injectable 0.1 milliGRAM(s) IV Push every 3 minutes PRN For ANY of the following changes in patient status:  A. RR LESS THAN 10 breaths per minute, B. Oxygen saturation LESS THAN 90%, C. Sedation score of 6  oxyCODONE    IR 5 milliGRAM(s) Oral every 3 hours PRN Moderate Pain (4 - 6)  oxyCODONE    IR 10 milliGRAM(s) Oral every 3 hours PRN Severe Pain (7 - 10)      LABS:                         14.1   8.70  )-----------( 224      ( 04 Jul 2021 06:23 )             41.5     07-04    132<L>  |  98  |  16  ----------------------------<  110<H>  3.5   |  20<L>  |  0.87    Ca    9.8      04 Jul 2021 06:23  Phos  3.4     07-04  Mg     2.10     07-04    TPro  7.0  /  Alb  4.2  /  TBili  1.0  /  DBili  x   /  AST  40  /  ALT  57<H>  /  AlkPhos  74  07-03

## 2021-07-06 LAB
CULTURE RESULTS: SIGNIFICANT CHANGE UP
ORGANISM # SPEC MICROSCOPIC CNT: SIGNIFICANT CHANGE UP
ORGANISM # SPEC MICROSCOPIC CNT: SIGNIFICANT CHANGE UP
SPECIMEN SOURCE: SIGNIFICANT CHANGE UP

## 2021-07-07 PROBLEM — I81 PORTAL VEIN THROMBOSIS: Chronic | Status: ACTIVE | Noted: 2021-06-21

## 2021-07-07 PROBLEM — K40.90 UNILATERAL INGUINAL HERNIA, WITHOUT OBSTRUCTION OR GANGRENE, NOT SPECIFIED AS RECURRENT: Chronic | Status: ACTIVE | Noted: 2021-06-21

## 2021-07-07 PROBLEM — K80.50 CALCULUS OF BILE DUCT WITHOUT CHOLANGITIS OR CHOLECYSTITIS WITHOUT OBSTRUCTION: Chronic | Status: ACTIVE | Noted: 2021-06-21

## 2021-07-16 ENCOUNTER — APPOINTMENT (OUTPATIENT)
Dept: SURGICAL ONCOLOGY | Facility: CLINIC | Age: 61
End: 2021-07-16
Payer: COMMERCIAL

## 2021-07-16 VITALS
SYSTOLIC BLOOD PRESSURE: 151 MMHG | OXYGEN SATURATION: 98 % | HEIGHT: 77 IN | RESPIRATION RATE: 16 BRPM | HEART RATE: 76 BPM | DIASTOLIC BLOOD PRESSURE: 91 MMHG | BODY MASS INDEX: 30.7 KG/M2 | WEIGHT: 260 LBS

## 2021-07-16 PROCEDURE — 99024 POSTOP FOLLOW-UP VISIT: CPT

## 2021-07-23 NOTE — REASON FOR VISIT
[de-identified] : open drainage and partial cholecystectomy [de-identified] : 7/1/21 [de-identified] : 2

## 2021-07-23 NOTE — ASSESSMENT
[FreeTextEntry1] : Mr. ALEX SAMS is a 61 year old who presents for post-operative evaluation after a open drainage and partial cholecystectomy on 7/1/21. Discussed that this was a difficult operation and it took 20 minutes to even identify the liver itself. There was omentum and colon over the liver that was densely adherent to the dome, and the the right colon was densely adherent to the gallbladder wall as was the duodenum. Given this extensive inflammation, the best solution was to remove a part of the gallbladder and drain the gallbladder contents which took care of the infectious risk. I also removed the perc guillermina tube that had actually migrated out of the gallbladder. \par We discussed his post-operative and recovery period and restrictions moving forward which include no lifting greater than 10 lbs for 8 weeks. We discussed dietary options and I encouraged the patient to advance as tolerated. His drain decreased and was removed in clinic without issue. He still does have a stent in place. I will reach out the the GI to get him plugged back in for stent removal. He will stay on the eliquis at this time.  We will plan to see him back in 6 months with a repeat CT. He will call the office for any concerns or if his condition changes. \par

## 2021-07-23 NOTE — PHYSICAL EXAM
[Normal] : supple, no neck mass and thyroid not enlarged [Normal] : oriented to person, place and time, with appropriate affect [de-identified] : surgical incision well approximated and intact. Abscess drain with minimal output was removed without issue.

## 2021-07-23 NOTE — HISTORY OF PRESENT ILLNESS
[FreeTextEntry1] : Mr. ALEX SAMS is a 61 year old who presents for post-operative evaluation after a open drainage and partial cholecystectomy on 7/1/21. He initially presented to an outside hospital with what was believed to be severe acute cholecystitis with gastric outlet obstruction.  There was concern of malignancy, however, his workup, which included multiple imaging studies as well as tumor markers and even a SpyGlass of the biliary tree revealed no evidence of definitive malignancy.  Of note, given the chronicity  of his cholecystitis, the patient also had a left portal vein thrombosis and major left liver atrophy. He had a percutaneous cholecystostomy tube placed prior to surgery. He has some incisional discomfort.  Denies nausea, vomiting or diarrhea. his appetite is improving but tires easily. His incision is well approximated, c/d/i without drainage.  he has an abscess drain in place with minimal output.\par \par \par \par

## 2021-07-26 ENCOUNTER — APPOINTMENT (OUTPATIENT)
Dept: GASTROENTEROLOGY | Facility: CLINIC | Age: 61
End: 2021-07-26
Payer: COMMERCIAL

## 2021-07-26 DIAGNOSIS — Z87.19 PERSONAL HISTORY OF OTHER DISEASES OF THE DIGESTIVE SYSTEM: ICD-10-CM

## 2021-07-26 DIAGNOSIS — K80.51 CALCULUS OF BILE DUCT W/OUT CHOLANGITIS OR CHOLECYSTITIS WITH OBSTRUCTION: ICD-10-CM

## 2021-07-26 PROCEDURE — 99441: CPT

## 2021-07-26 NOTE — HISTORY OF PRESENT ILLNESS
[de-identified] : Due to COVID 19 pandemic, telephonic visit was scheduled to decrease any chance of exposure. Verbal consent was obtained from the patient.\par Patient follow-up is being performed after the open cholecystectomy.  He has history of severe acute cholecystitis with gastric outlet obstruction requiring cholecystostomy tube placement.  He has undergone EGD/EUS with ERCP with spyglass which was fairly unremarkable.  He has a biliary stent in place and underwent a partial cholecystectomy and open drainage on July 1, 2021.  He is doing much better at this time.  He has no difficulty eating.  He is already scheduled for ERCP with stent removal.

## 2021-07-26 NOTE — ASSESSMENT
[FreeTextEntry1] : The patient had fever after last ERCP.  Patient is already scheduled for ERCP with stent removal and cholangiogram.  If there is no evidence of any leak, no stent reinsertion will be needed. Risks (including bleeding, pain, perforation, incomplete examination, adverse reactions to medications, aspiration and death), benefits and alternatives were discussed. Patient is agreeable for the ERCP. The patient is medically optimized for the procedure. We will schedule the patient for the procedure.\par We will give the patient 3 days of antibiotics after the procedure.\par \par I spent 10 minutes on the encounter\par \par Antony Diez MD\par Gastroenterology \par \par

## 2021-07-31 LAB
CULTURE RESULTS: SIGNIFICANT CHANGE UP
SPECIMEN SOURCE: SIGNIFICANT CHANGE UP

## 2021-08-03 ENCOUNTER — OUTPATIENT (OUTPATIENT)
Dept: OUTPATIENT SERVICES | Facility: HOSPITAL | Age: 61
LOS: 1 days | End: 2021-08-03
Payer: COMMERCIAL

## 2021-08-03 ENCOUNTER — RESULT REVIEW (OUTPATIENT)
Age: 61
End: 2021-08-03

## 2021-08-03 ENCOUNTER — APPOINTMENT (OUTPATIENT)
Dept: GASTROENTEROLOGY | Facility: HOSPITAL | Age: 61
End: 2021-08-03

## 2021-08-03 ENCOUNTER — TRANSCRIPTION ENCOUNTER (OUTPATIENT)
Age: 61
End: 2021-08-03

## 2021-08-03 DIAGNOSIS — Z98.890 OTHER SPECIFIED POSTPROCEDURAL STATES: Chronic | ICD-10-CM

## 2021-08-03 DIAGNOSIS — K81.9 CHOLECYSTITIS, UNSPECIFIED: ICD-10-CM

## 2021-08-03 DIAGNOSIS — Z43.4 ENCOUNTER FOR ATTENTION TO OTHER ARTIFICIAL OPENINGS OF DIGESTIVE TRACT: Chronic | ICD-10-CM

## 2021-08-03 PROCEDURE — 88300 SURGICAL PATH GROSS: CPT

## 2021-08-03 PROCEDURE — 43275 ERCP REMOVE FORGN BODY DUCT: CPT

## 2021-08-03 PROCEDURE — 88300 SURGICAL PATH GROSS: CPT | Mod: 26

## 2021-08-03 PROCEDURE — 74330 X-RAY BILE/PANC ENDOSCOPY: CPT

## 2021-08-03 PROCEDURE — C1773: CPT

## 2021-08-03 PROCEDURE — C1769: CPT

## 2021-08-03 RX ORDER — AMOXICILLIN AND CLAVULANATE POTASSIUM 875; 125 MG/1; MG/1
875-125 TABLET, COATED ORAL
Qty: 6 | Refills: 0 | Status: ACTIVE | COMMUNITY
Start: 2021-08-03 | End: 1900-01-01

## 2021-08-06 LAB — SURGICAL PATHOLOGY STUDY: SIGNIFICANT CHANGE UP

## 2021-11-23 ENCOUNTER — APPOINTMENT (OUTPATIENT)
Dept: DERMATOLOGY | Facility: CLINIC | Age: 61
End: 2021-11-23
Payer: COMMERCIAL

## 2021-11-23 PROCEDURE — 17003 DESTRUCT PREMALG LES 2-14: CPT

## 2021-11-23 PROCEDURE — 17000 DESTRUCT PREMALG LESION: CPT

## 2021-11-23 PROCEDURE — 99213 OFFICE O/P EST LOW 20 MIN: CPT | Mod: 25

## 2021-12-27 NOTE — ASU PATIENT PROFILE, ADULT - MENTAL HEALTH CONDITIONS/SYMPTOMS, PROFILE
none Cephalexin Counseling: I counseled the patient regarding use of cephalexin as an antibiotic for prophylactic and/or therapeutic purposes. Cephalexin (commonly prescribed under brand name Keflex) is a cephalosporin antibiotic which is active against numerous classes of bacteria, including most skin bacteria. Side effects may include nausea, diarrhea, gastrointestinal upset, rash, hives, yeast infections, and in rare cases, hepatitis, kidney disease, seizures, fever, confusion, neurologic symptoms, and others. Patients with severe allergies to penicillin medications are cautioned that there is about a 10% incidence of cross-reactivity with cephalosporins. When possible, patients with penicillin allergies should use alternatives to cephalosporins for antibiotic therapy.

## 2022-01-03 ENCOUNTER — OUTPATIENT (OUTPATIENT)
Dept: OUTPATIENT SERVICES | Facility: HOSPITAL | Age: 62
LOS: 1 days | End: 2022-01-03
Payer: COMMERCIAL

## 2022-01-03 ENCOUNTER — APPOINTMENT (OUTPATIENT)
Dept: CT IMAGING | Facility: CLINIC | Age: 62
End: 2022-01-03
Payer: COMMERCIAL

## 2022-01-03 DIAGNOSIS — Z43.4 ENCOUNTER FOR ATTENTION TO OTHER ARTIFICIAL OPENINGS OF DIGESTIVE TRACT: Chronic | ICD-10-CM

## 2022-01-03 DIAGNOSIS — Z98.890 OTHER SPECIFIED POSTPROCEDURAL STATES: Chronic | ICD-10-CM

## 2022-01-03 DIAGNOSIS — Z87.19 PERSONAL HISTORY OF OTHER DISEASES OF THE DIGESTIVE SYSTEM: ICD-10-CM

## 2022-01-03 DIAGNOSIS — Z00.8 ENCOUNTER FOR OTHER GENERAL EXAMINATION: ICD-10-CM

## 2022-01-03 PROCEDURE — 74160 CT ABDOMEN W/CONTRAST: CPT | Mod: 26

## 2022-01-03 PROCEDURE — 82565 ASSAY OF CREATININE: CPT

## 2022-01-03 PROCEDURE — 74160 CT ABDOMEN W/CONTRAST: CPT

## 2022-01-07 ENCOUNTER — APPOINTMENT (OUTPATIENT)
Dept: SURGICAL ONCOLOGY | Facility: CLINIC | Age: 62
End: 2022-01-07
Payer: COMMERCIAL

## 2022-01-07 PROCEDURE — 99214 OFFICE O/P EST MOD 30 MIN: CPT | Mod: 95

## 2022-01-18 NOTE — ASSESSMENT
[FreeTextEntry1] : Mr. ALEX SAMS is a 61 year old who presents for follow up evaluation after a open drainage and partial cholecystectomy on 7/1/21. He initially presented to an outside hospital with what was believed to be severe acute cholecystitis with gastric outlet obstruction. There was concern of malignancy, however, his workup, which included multiple imaging studies as well as tumor markers and even a SpyGlass of the biliary tree revealed no evidence of definitive malignancy. Of note, given the chronicity of his cholecystitis, the patient also had a left portal vein thrombosis and major left liver atrophy and was treated with a full 6 month course of anticoagulation. Had the stent removed with GI in August. Underwent a CT 1/3/22 that I reviewed that re-demonstrates atrophy of the left side of the liver and otherwise looks good. Discussed the concern for underlying occult malignancy given his initial presentation, symptomatology, and diagnostic findings and would recommend a 6 month follow up with a CT. Also recommended he see his PCP for routine evaluation and follow up.  \par \par Today, I personally spent 35 minutes in total time including reviewing imaging and studies, discussing complex treatment regimens, direct face to face time with the patient via telehealth, patient education and counseling.\par

## 2022-01-18 NOTE — HISTORY OF PRESENT ILLNESS
[de-identified] : This visit was provided via telehealth using real-time 2-way audio visual technology. The patient, ALEX SAMS, was located at home [address] [place] [address] at the time of the visit. This provider was located at the clinic in [place], [place] at the time of the visit. The provider, patient participated in the telehealth encounter.  Verbal consent was given 01/07/2022 by the patient. \par \par Mr. ALEX SAMS is a 61 year old who presents for follow up evaluation after a open drainage and partial cholecystectomy on 7/1/21. He initially presented to an outside hospital with what was believed to be severe acute cholecystitis with gastric outlet obstruction. There was concern of malignancy, however, his workup, which included multiple imaging studies as well as tumor markers and even a SpyGlass of the biliary tree revealed no evidence of definitive malignancy. Of note, given the chronicity of his cholecystitis, the patient also had a left portal vein thrombosis and major left liver atrophy and was treated with a full 6 month course of anticoagulation. He complains of some slight tightness on the right side of the abdomen over the last couple of weeks. No other issues.  Underwent a CT 1/3/22 and presents to discuss.

## 2022-07-19 ENCOUNTER — APPOINTMENT (OUTPATIENT)
Dept: CT IMAGING | Facility: CLINIC | Age: 62
End: 2022-07-19

## 2022-07-19 ENCOUNTER — OUTPATIENT (OUTPATIENT)
Dept: OUTPATIENT SERVICES | Facility: HOSPITAL | Age: 62
LOS: 1 days | End: 2022-07-19
Payer: COMMERCIAL

## 2022-07-19 DIAGNOSIS — Z43.4 ENCOUNTER FOR ATTENTION TO OTHER ARTIFICIAL OPENINGS OF DIGESTIVE TRACT: Chronic | ICD-10-CM

## 2022-07-19 DIAGNOSIS — K83.1 OBSTRUCTION OF BILE DUCT: ICD-10-CM

## 2022-07-19 DIAGNOSIS — Z98.890 OTHER SPECIFIED POSTPROCEDURAL STATES: Chronic | ICD-10-CM

## 2022-07-19 DIAGNOSIS — Z00.8 ENCOUNTER FOR OTHER GENERAL EXAMINATION: ICD-10-CM

## 2022-07-19 PROCEDURE — 74160 CT ABDOMEN W/CONTRAST: CPT

## 2022-07-19 PROCEDURE — 74160 CT ABDOMEN W/CONTRAST: CPT | Mod: 26

## 2022-07-22 ENCOUNTER — APPOINTMENT (OUTPATIENT)
Dept: SURGICAL ONCOLOGY | Facility: CLINIC | Age: 62
End: 2022-07-22

## 2022-07-22 VITALS
SYSTOLIC BLOOD PRESSURE: 148 MMHG | TEMPERATURE: 97.9 F | HEIGHT: 77 IN | DIASTOLIC BLOOD PRESSURE: 87 MMHG | OXYGEN SATURATION: 97 % | WEIGHT: 285 LBS | HEART RATE: 61 BPM | BODY MASS INDEX: 33.65 KG/M2 | RESPIRATION RATE: 16 BRPM

## 2022-07-22 PROCEDURE — 99212 OFFICE O/P EST SF 10 MIN: CPT

## 2022-08-02 NOTE — ASSESSMENT
[FreeTextEntry1] : Mr. ALEX SAMS is a 62 year old who presents for follow up evaluation after a open drainage and partial cholecystectomy on 7/1/21. He initially presented to an outside hospital with what was believed to be severe acute cholecystitis with gastric outlet obstruction. There was concern of malignancy, however, his workup, which included multiple imaging studies as well as tumor markers and even a SpyGlass of the biliary tree revealed no evidence of definitive malignancy. Of note, given the chronicity of his cholecystitis, the patient also had a left portal vein thrombosis and major left liver atrophy and was treated with a full 6 month course of anticoagulation. He feels well from a surgical perspective. no fevers/chills.  Underwent a CT 7/19/22 that i reviewed and demonstrates a stable examination compared with January 03, 2022. No biliary ductal dilatation or hepatobiliary mass noted.  Discussed that there was a concern for underlying occult malignancy given his initial presentation, symptomatology, and diagnostic findings but I feel confident at this point that if this was a malignant process it should have declared itself at this point. He can follow up as needed.\par \par Today, I personally spent 15 minutes in total time including reviewing imaging and studies, discussing complex treatment regimens, direct face to face time with the patient, patient education and counseling.\par

## 2022-08-02 NOTE — HISTORY OF PRESENT ILLNESS
[de-identified] : Mr. ALEX SAMS is a 62 year old who presents for follow up evaluation after a open drainage and partial cholecystectomy on 7/1/21. He initially presented to an outside hospital with what was believed to be severe acute cholecystitis with gastric outlet obstruction. There was concern of malignancy, however, his workup, which included multiple imaging studies as well as tumor markers and even a SpyGlass of the biliary tree revealed no evidence of definitive malignancy. Of note, given the chronicity of his cholecystitis, the patient also had a left portal vein thrombosis and major left liver atrophy and was treated with a full 6 month course of anticoagulation. He feels well from a surgical perspective. Underwent a CT 7/19/22 and presents to discuss.

## 2022-08-02 NOTE — PHYSICAL EXAM
[FreeTextEntry1] : General: No acute distress. Well nourished and well kempt.\par Head: AT/NC\par Eyes: PERRL. EOMI.\par Pulmonary: No respiratory distress.\par ABD: Soft. NT/ND. No rebound, no guarding, no rigidity. No peritoneal signs. No masses.  Incision healed. No hernia appreciated.\par Extremities: Warm. No edema. \par Neurological: A&O x 4.\par Psychiatric: Normal affect and mood.\par

## 2023-02-02 ENCOUNTER — APPOINTMENT (OUTPATIENT)
Dept: DERMATOLOGY | Facility: CLINIC | Age: 63
End: 2023-02-02
Payer: COMMERCIAL

## 2023-02-02 PROCEDURE — 17000 DESTRUCT PREMALG LESION: CPT

## 2023-02-02 PROCEDURE — 99213 OFFICE O/P EST LOW 20 MIN: CPT | Mod: 25

## 2023-02-02 PROCEDURE — 17003 DESTRUCT PREMALG LES 2-14: CPT

## 2023-03-31 ENCOUNTER — APPOINTMENT (OUTPATIENT)
Dept: SURGICAL ONCOLOGY | Facility: CLINIC | Age: 63
End: 2023-03-31
Payer: COMMERCIAL

## 2023-03-31 VITALS
RESPIRATION RATE: 16 BRPM | SYSTOLIC BLOOD PRESSURE: 159 MMHG | OXYGEN SATURATION: 98 % | HEIGHT: 77 IN | WEIGHT: 260 LBS | HEART RATE: 80 BPM | BODY MASS INDEX: 30.7 KG/M2 | DIASTOLIC BLOOD PRESSURE: 93 MMHG

## 2023-03-31 DIAGNOSIS — K83.1 OBSTRUCTION OF BILE DUCT: ICD-10-CM

## 2023-03-31 PROCEDURE — 99212 OFFICE O/P EST SF 10 MIN: CPT

## 2023-04-14 PROBLEM — K83.1 BILE DUCT STRICTURE: Status: ACTIVE | Noted: 2021-08-03

## 2023-04-14 NOTE — ASSESSMENT
[FreeTextEntry1] : Mr. ALEX SAMS is a 62 year old who presents for follow up evaluation after a open drainage and partial cholecystectomy on 7/1/21. He initially presented to an outside hospital with what was believed to be severe acute cholecystitis with gastric outlet obstruction. There was concern of malignancy, however, his workup, which included multiple imaging studies as well as tumor markers and even a SpyGlass of the biliary tree revealed no evidence of definitive malignancy. Of note, given the chronicity of his cholecystitis, the patient also had a left portal vein thrombosis and major left liver atrophy and was treated with a full 6 month course of anticoagulation.  \par Thought he may have a hernia, says he feels some tightness on the incision but the discomfort has resolved. No hernia appreciated on PE. States he was constipated at the time that has resolved, discussed he may have been feeling that.  Discussed he can f/u PRN.  If he develops worsening symptoms or will contact the office and would obtain a CT and see back to discuss. \par \par Today, I personally spent 15 minutes in total time including reviewing imaging and studies, discussing complex treatment regimens, direct face to face time with the patient, patient education and counseling.\par

## 2023-04-14 NOTE — PHYSICAL EXAM
[FreeTextEntry1] : General: No acute distress. Well nourished and well kempt.\par Head: AT/NC\par Eyes: PERRL. EOMI.\par Pulmonary: No respiratory distress. \par ABD: Soft. NT/ND. No rebound, no guarding, no rigidity. No peritoneal signs. No masses.  Incision healed.  No hernia appreciated.\par Extremities: Warm. No edema.\par Neurological: A&O x 4.\par Psychiatric: Normal affect and mood.\par

## 2023-04-14 NOTE — HISTORY OF PRESENT ILLNESS
[de-identified] : Mr. ALEX SAMS is a 62 year old who presents for follow up evaluation after a open drainage and partial cholecystectomy on 7/1/21. He initially presented to an outside hospital with what was believed to be severe acute cholecystitis with gastric outlet obstruction. There was concern of malignancy, however, his workup, which included multiple imaging studies as well as tumor markers and even a SpyGlass of the biliary tree revealed no evidence of definitive malignancy. Of note, given the chronicity of his cholecystitis, the patient also had a left portal vein thrombosis and major left liver atrophy and was treated with a full 6 month course of anticoagulation. He feels well, thought he may have a hernia and presents to discuss. \zach Feels some tightness on the incision but the discomfort has resolved. No complaints today

## 2024-02-05 ENCOUNTER — APPOINTMENT (OUTPATIENT)
Dept: DERMATOLOGY | Facility: CLINIC | Age: 64
End: 2024-02-05
Payer: COMMERCIAL

## 2024-02-05 PROCEDURE — 99213 OFFICE O/P EST LOW 20 MIN: CPT | Mod: 25

## 2024-02-05 PROCEDURE — 11900 INJECT SKIN LESIONS </W 7: CPT | Mod: 59

## 2024-02-05 PROCEDURE — 10060 I&D ABSCESS SIMPLE/SINGLE: CPT

## 2025-01-13 ENCOUNTER — APPOINTMENT (OUTPATIENT)
Dept: DERMATOLOGY | Facility: CLINIC | Age: 65
End: 2025-01-13
Payer: COMMERCIAL

## 2025-01-13 PROCEDURE — 99213 OFFICE O/P EST LOW 20 MIN: CPT

## 2025-03-19 ENCOUNTER — TRANSCRIPTION ENCOUNTER (OUTPATIENT)
Age: 65
End: 2025-03-19

## 2025-03-19 ENCOUNTER — APPOINTMENT (OUTPATIENT)
Dept: GASTROENTEROLOGY | Facility: CLINIC | Age: 65
End: 2025-03-19
Payer: COMMERCIAL

## 2025-03-19 VITALS
HEART RATE: 70 BPM | OXYGEN SATURATION: 16 % | SYSTOLIC BLOOD PRESSURE: 140 MMHG | WEIGHT: 260 LBS | DIASTOLIC BLOOD PRESSURE: 70 MMHG | RESPIRATION RATE: 16 BRPM | HEIGHT: 77 IN | BODY MASS INDEX: 30.7 KG/M2

## 2025-03-19 DIAGNOSIS — R10.11 RIGHT UPPER QUADRANT PAIN: ICD-10-CM

## 2025-03-19 DIAGNOSIS — I81 PORTAL VEIN THROMBOSIS: ICD-10-CM

## 2025-03-19 PROCEDURE — 99204 OFFICE O/P NEW MOD 45 MIN: CPT

## 2025-03-21 LAB
ALBUMIN SERPL ELPH-MCNC: 4.6 G/DL
ALP BLD-CCNC: 72 U/L
ALT SERPL-CCNC: 38 U/L
ANION GAP SERPL CALC-SCNC: 12 MMOL/L
AST SERPL-CCNC: 25 U/L
BASOPHILS # BLD AUTO: 0.04 K/UL
BASOPHILS NFR BLD AUTO: 0.7 %
BILIRUB SERPL-MCNC: 0.8 MG/DL
BUN SERPL-MCNC: 20 MG/DL
CALCIUM SERPL-MCNC: 10.1 MG/DL
CHLORIDE SERPL-SCNC: 106 MMOL/L
CO2 SERPL-SCNC: 26 MMOL/L
CREAT SERPL-MCNC: 1.15 MG/DL
EGFRCR SERPLBLD CKD-EPI 2021: 71 ML/MIN/1.73M2
EOSINOPHIL # BLD AUTO: 0.16 K/UL
EOSINOPHIL NFR BLD AUTO: 2.7 %
GLUCOSE SERPL-MCNC: 149 MG/DL
HCT VFR BLD CALC: 46 %
HGB BLD-MCNC: 15 G/DL
IMM GRANULOCYTES NFR BLD AUTO: 0.2 %
INR PPP: 0.9 RATIO
LYMPHOCYTES # BLD AUTO: 1.55 K/UL
LYMPHOCYTES NFR BLD AUTO: 26.4 %
MAN DIFF?: NORMAL
MCHC RBC-ENTMCNC: 30.3 PG
MCHC RBC-ENTMCNC: 32.6 G/DL
MCV RBC AUTO: 92.9 FL
MONOCYTES # BLD AUTO: 0.52 K/UL
MONOCYTES NFR BLD AUTO: 8.9 %
NEUTROPHILS # BLD AUTO: 3.59 K/UL
NEUTROPHILS NFR BLD AUTO: 61.1 %
PLATELET # BLD AUTO: 212 K/UL
POTASSIUM SERPL-SCNC: 4.8 MMOL/L
PROT SERPL-MCNC: 7 G/DL
PT BLD: 10.7 SEC
RBC # BLD: 4.95 M/UL
RBC # FLD: 13.2 %
SODIUM SERPL-SCNC: 144 MMOL/L
WBC # FLD AUTO: 5.87 K/UL

## 2025-04-07 ENCOUNTER — APPOINTMENT (OUTPATIENT)
Dept: MRI IMAGING | Facility: CLINIC | Age: 65
End: 2025-04-07
Payer: COMMERCIAL

## 2025-04-07 ENCOUNTER — OUTPATIENT (OUTPATIENT)
Dept: OUTPATIENT SERVICES | Facility: HOSPITAL | Age: 65
LOS: 1 days | End: 2025-04-07

## 2025-04-07 DIAGNOSIS — Z98.890 OTHER SPECIFIED POSTPROCEDURAL STATES: Chronic | ICD-10-CM

## 2025-04-07 DIAGNOSIS — Z43.4 ENCOUNTER FOR ATTENTION TO OTHER ARTIFICIAL OPENINGS OF DIGESTIVE TRACT: Chronic | ICD-10-CM

## 2025-04-07 DIAGNOSIS — R10.11 RIGHT UPPER QUADRANT PAIN: ICD-10-CM

## 2025-04-07 PROCEDURE — 74183 MRI ABD W/O CNTR FLWD CNTR: CPT | Mod: 26

## 2025-04-16 RX ORDER — LOSARTAN POTASSIUM 50 MG/1
50 TABLET, FILM COATED ORAL
Refills: 0 | Status: ACTIVE | COMMUNITY
Start: 2025-04-16

## 2025-06-05 ENCOUNTER — TRANSCRIPTION ENCOUNTER (OUTPATIENT)
Age: 65
End: 2025-06-05

## 2025-06-05 ENCOUNTER — RESULT REVIEW (OUTPATIENT)
Age: 65
End: 2025-06-05

## 2025-06-05 ENCOUNTER — APPOINTMENT (OUTPATIENT)
Dept: GASTROENTEROLOGY | Facility: HOSPITAL | Age: 65
End: 2025-06-05

## 2025-06-05 ENCOUNTER — OUTPATIENT (OUTPATIENT)
Dept: OUTPATIENT SERVICES | Facility: HOSPITAL | Age: 65
LOS: 1 days | End: 2025-06-05
Payer: COMMERCIAL

## 2025-06-05 VITALS
RESPIRATION RATE: 20 BRPM | HEIGHT: 77 IN | SYSTOLIC BLOOD PRESSURE: 128 MMHG | DIASTOLIC BLOOD PRESSURE: 78 MMHG | OXYGEN SATURATION: 100 % | WEIGHT: 279.99 LBS | HEART RATE: 64 BPM | TEMPERATURE: 98 F

## 2025-06-05 VITALS
OXYGEN SATURATION: 97 % | SYSTOLIC BLOOD PRESSURE: 151 MMHG | RESPIRATION RATE: 20 BRPM | HEART RATE: 62 BPM | TEMPERATURE: 98 F | DIASTOLIC BLOOD PRESSURE: 93 MMHG

## 2025-06-05 DIAGNOSIS — Z98.890 OTHER SPECIFIED POSTPROCEDURAL STATES: Chronic | ICD-10-CM

## 2025-06-05 DIAGNOSIS — K83.1 OBSTRUCTION OF BILE DUCT: ICD-10-CM

## 2025-06-05 DIAGNOSIS — Z43.4 ENCOUNTER FOR ATTENTION TO OTHER ARTIFICIAL OPENINGS OF DIGESTIVE TRACT: Chronic | ICD-10-CM

## 2025-06-05 DIAGNOSIS — K80.20 CALCULUS OF GALLBLADDER W/OUT CHOLECYSTITIS W/OUT OBSTRUCTION: ICD-10-CM

## 2025-06-05 PROCEDURE — C1726: CPT

## 2025-06-05 PROCEDURE — 74329 X-RAY FOR PANCREAS ENDOSCOPY: CPT

## 2025-06-05 PROCEDURE — 43277 ERCP EA DUCT/AMPULLA DILATE: CPT | Mod: 59

## 2025-06-05 PROCEDURE — 43239 EGD BIOPSY SINGLE/MULTIPLE: CPT

## 2025-06-05 PROCEDURE — C1769: CPT

## 2025-06-05 PROCEDURE — C1889: CPT

## 2025-06-05 PROCEDURE — 88342 IMHCHEM/IMCYTCHM 1ST ANTB: CPT | Mod: 26

## 2025-06-05 PROCEDURE — C2625: CPT

## 2025-06-05 PROCEDURE — 43273 ENDOSCOPIC PANCREATOSCOPY: CPT | Mod: 59

## 2025-06-05 PROCEDURE — 88305 TISSUE EXAM BY PATHOLOGIST: CPT

## 2025-06-05 PROCEDURE — 88305 TISSUE EXAM BY PATHOLOGIST: CPT | Mod: 26

## 2025-06-05 PROCEDURE — 43239 EGD BIOPSY SINGLE/MULTIPLE: CPT | Mod: 59

## 2025-06-05 PROCEDURE — 43273 ENDOSCOPIC PANCREATOSCOPY: CPT

## 2025-06-05 PROCEDURE — 88342 IMHCHEM/IMCYTCHM 1ST ANTB: CPT

## 2025-06-05 PROCEDURE — 43265 ERCP LITHOTRIPSY CALCULI: CPT

## 2025-06-05 PROCEDURE — 43274 ERCP DUCT STENT PLACEMENT: CPT | Mod: 59

## 2025-06-05 PROCEDURE — 74328 X-RAY BILE DUCT ENDOSCOPY: CPT | Mod: 26,59

## 2025-06-05 PROCEDURE — C2617: CPT

## 2025-06-05 PROCEDURE — 43274 ERCP DUCT STENT PLACEMENT: CPT

## 2025-06-05 DEVICE — STENT ADVANIX BIL 7FR 9CM PRELOADED: Type: IMPLANTABLE DEVICE | Status: FUNCTIONAL

## 2025-06-05 DEVICE — DREAMWIRE STD .035IN STRT: Type: IMPLANTABLE DEVICE | Status: FUNCTIONAL

## 2025-06-05 DEVICE — IMPLANTABLE DEVICE: Type: IMPLANTABLE DEVICE | Status: FUNCTIONAL

## 2025-06-05 DEVICE — CATH BLLN EXTRACT DIST GUIDE WIRE 15MM 3LUM: Type: IMPLANTABLE DEVICE | Status: FUNCTIONAL

## 2025-06-05 DEVICE — HYDRATOME 44: Type: IMPLANTABLE DEVICE | Status: FUNCTIONAL

## 2025-06-05 DEVICE — PROBE EHL BILIARY 1.9FR 375CM: Type: IMPLANTABLE DEVICE | Status: FUNCTIONAL

## 2025-06-05 DEVICE — CATH KT BLN DIL RX OLB 8MMX4CM: Type: IMPLANTABLE DEVICE | Status: FUNCTIONAL

## 2025-06-05 DEVICE — CATH KT BLLN DIL RX OLB 6MMX4CM: Type: IMPLANTABLE DEVICE | Status: FUNCTIONAL

## 2025-06-05 DEVICE — BASKET RETREIVAL SPYGLASS 15MM 286CM: Type: IMPLANTABLE DEVICE | Status: FUNCTIONAL

## 2025-06-05 RX ORDER — INDOMETHACIN 50 MG
100 CAPSULE ORAL ONCE
Refills: 0 | Status: COMPLETED | OUTPATIENT
Start: 2025-06-05 | End: 2025-06-05

## 2025-06-05 RX ORDER — AMOXICILLIN AND CLAVULANATE POTASSIUM 875; 125 MG/1; MG/1
875-125 TABLET, COATED ORAL
Qty: 20 | Refills: 0 | Status: ACTIVE | COMMUNITY
Start: 2025-06-05 | End: 1900-01-01

## 2025-06-05 RX ORDER — AMPICILLIN SODIUM AND SULBACTAM SODIUM 1; .5 G/1; G/1
1.5 INJECTION, POWDER, FOR SOLUTION INTRAMUSCULAR; INTRAVENOUS ONCE
Refills: 0 | Status: COMPLETED | OUTPATIENT
Start: 2025-06-05 | End: 2025-06-05

## 2025-06-05 RX ADMIN — Medication 100 MILLIGRAM(S): at 13:39

## 2025-06-05 RX ADMIN — AMPICILLIN SODIUM AND SULBACTAM SODIUM 100 GRAM(S): 1; .5 INJECTION, POWDER, FOR SOLUTION INTRAMUSCULAR; INTRAVENOUS at 13:39

## 2025-06-05 NOTE — ASU PATIENT PROFILE, ADULT - FALL HARM RISK - UNIVERSAL INTERVENTIONS
Bed in lowest position, wheels locked, appropriate side rails in place/Call bell, personal items and telephone in reach/Instruct patient to call for assistance before getting out of bed or chair/Non-slip footwear when patient is out of bed/[place] to call system/Physically safe environment - no spills, clutter or unnecessary equipment/Purposeful Proactive Rounding/Room/bathroom lighting operational, light cord in reach

## 2025-06-11 LAB — SURGICAL PATHOLOGY STUDY: SIGNIFICANT CHANGE UP

## 2025-07-24 ENCOUNTER — APPOINTMENT (OUTPATIENT)
Dept: GASTROENTEROLOGY | Facility: HOSPITAL | Age: 65
End: 2025-07-24

## 2025-07-24 ENCOUNTER — OUTPATIENT (OUTPATIENT)
Dept: OUTPATIENT SERVICES | Facility: HOSPITAL | Age: 65
LOS: 1 days | End: 2025-07-24
Payer: COMMERCIAL

## 2025-07-24 ENCOUNTER — TRANSCRIPTION ENCOUNTER (OUTPATIENT)
Age: 65
End: 2025-07-24

## 2025-07-24 VITALS
OXYGEN SATURATION: 100 % | HEIGHT: 77 IN | WEIGHT: 279.99 LBS | SYSTOLIC BLOOD PRESSURE: 151 MMHG | RESPIRATION RATE: 20 BRPM | TEMPERATURE: 97 F | HEART RATE: 65 BPM | DIASTOLIC BLOOD PRESSURE: 87 MMHG

## 2025-07-24 VITALS
SYSTOLIC BLOOD PRESSURE: 143 MMHG | OXYGEN SATURATION: 100 % | DIASTOLIC BLOOD PRESSURE: 84 MMHG | HEART RATE: 53 BPM | RESPIRATION RATE: 16 BRPM

## 2025-07-24 DIAGNOSIS — Z98.890 OTHER SPECIFIED POSTPROCEDURAL STATES: Chronic | ICD-10-CM

## 2025-07-24 DIAGNOSIS — K83.1 OBSTRUCTION OF BILE DUCT: ICD-10-CM

## 2025-07-24 DIAGNOSIS — Z43.4 ENCOUNTER FOR ATTENTION TO OTHER ARTIFICIAL OPENINGS OF DIGESTIVE TRACT: Chronic | ICD-10-CM

## 2025-07-24 PROCEDURE — C1748: CPT

## 2025-07-24 PROCEDURE — 43275 ERCP REMOVE FORGN BODY DUCT: CPT | Mod: 59

## 2025-07-24 PROCEDURE — 43264 ERCP REMOVE DUCT CALCULI: CPT

## 2025-07-24 PROCEDURE — 74329 X-RAY FOR PANCREAS ENDOSCOPY: CPT

## 2025-07-24 PROCEDURE — 43273 ENDOSCOPIC PANCREATOSCOPY: CPT | Mod: 59

## 2025-07-24 PROCEDURE — 74328 X-RAY BILE DUCT ENDOSCOPY: CPT | Mod: 26,59

## 2025-07-24 PROCEDURE — 43273 ENDOSCOPIC PANCREATOSCOPY: CPT

## 2025-07-24 PROCEDURE — 43275 ERCP REMOVE FORGN BODY DUCT: CPT

## 2025-07-24 PROCEDURE — 43235 EGD DIAGNOSTIC BRUSH WASH: CPT | Mod: 59

## 2025-07-24 PROCEDURE — C1769: CPT

## 2025-07-24 PROCEDURE — 43262 ENDO CHOLANGIOPANCREATOGRAPH: CPT

## 2025-07-24 DEVICE — DUODENOSCOPE EXALT MODEL D SINGLE USE: Type: IMPLANTABLE DEVICE | Status: FUNCTIONAL

## 2025-07-24 DEVICE — IMPLANTABLE DEVICE: Type: IMPLANTABLE DEVICE | Status: FUNCTIONAL

## 2025-07-24 DEVICE — SPHINCTERTOME JAG RX 39 PRELOADED CANN: Type: IMPLANTABLE DEVICE | Status: FUNCTIONAL

## 2025-07-24 RX ORDER — AMPICILLIN SODIUM AND SULBACTAM SODIUM 1; .5 G/1; G/1
3 INJECTION, POWDER, FOR SOLUTION INTRAMUSCULAR; INTRAVENOUS ONCE
Refills: 0 | Status: COMPLETED | OUTPATIENT
Start: 2025-07-24 | End: 2025-07-24

## 2025-07-24 RX ORDER — INDOMETHACIN 50 MG
100 CAPSULE ORAL ONCE
Refills: 0 | Status: COMPLETED | OUTPATIENT
Start: 2025-07-24 | End: 2025-07-24

## 2025-07-24 RX ADMIN — AMPICILLIN SODIUM AND SULBACTAM SODIUM 200 GRAM(S): 1; .5 INJECTION, POWDER, FOR SOLUTION INTRAMUSCULAR; INTRAVENOUS at 11:03

## 2025-07-24 RX ADMIN — Medication 100 MILLIGRAM(S): at 11:04

## (undated) DEVICE — GOWN IMPERV XL

## (undated) DEVICE — DRSG 2X2

## (undated) DEVICE — Device

## (undated) DEVICE — SPYSCOPE DS 2

## (undated) DEVICE — DENTURE CUP PINK

## (undated) DEVICE — FORCEP RADIAL JAW 4 W NDL 2.4MM 2.8MM 240CM ORANGE DISP

## (undated) DEVICE — MASK PROCEDURE EARLOOP LVL 2 50/BX

## (undated) DEVICE — DRSG CURITY GAUZE SPONGE 4 X 4" 12-PLY NON-STERILE

## (undated) DEVICE — SYR LUER SLIP TIP 50CC

## (undated) DEVICE — TUBING IV EXTENSION MACRO W CLAVE 7"

## (undated) DEVICE — WARMING BLANKET FULL ADULT

## (undated) DEVICE — DVC CLEVERLOCK

## (undated) DEVICE — BITE BLOCK ADULT 20 X 27MM (GREEN)

## (undated) DEVICE — KIT DEFENDO 4 OLY 4 PC

## (undated) DEVICE — VENODYNE/SCD SLEEVE CALF MEDIUM

## (undated) DEVICE — SOL IRR BAG H2O 1000ML

## (undated) DEVICE — SYR SLIP 10CC

## (undated) DEVICE — UNDERPAD LINEN SAVER 23 X 36"

## (undated) DEVICE — TUBING ALARIS PUMP MODULE NON-DEHP

## (undated) DEVICE — SOL BAG NS 0.9% 1000ML

## (undated) DEVICE — DVC AUTOCAP RX LOKG EXALT

## (undated) DEVICE — SOL IRR BAG NS 0.9% 1000ML

## (undated) DEVICE — PACK IV START WITH CHG

## (undated) DEVICE — SSH-ERBE RM1 11351341: Type: DURABLE MEDICAL EQUIPMENT

## (undated) DEVICE — CATH IV SAFE BC 22G X 1" (BLUE)

## (undated) DEVICE — SENSOR O2 FINGER ADULT

## (undated) DEVICE — SYR IV FLUSH SALINE 10ML 30/TY

## (undated) DEVICE — DEVICE GRASPING RAPTOR

## (undated) DEVICE — ELCTR GROUNDING PAD ADULT COVIDIEN

## (undated) DEVICE — FORCEP SPYBITE MAX BIOPSY

## (undated) DEVICE — SYR ALLIANCE II INFLATION 60ML